# Patient Record
Sex: MALE | URBAN - METROPOLITAN AREA
[De-identification: names, ages, dates, MRNs, and addresses within clinical notes are randomized per-mention and may not be internally consistent; named-entity substitution may affect disease eponyms.]

---

## 2019-02-06 ENCOUNTER — INPATIENT (INPATIENT)
Facility: HOSPITAL | Age: 59
LOS: 9 days | Discharge: HOME | DRG: 233 | End: 2019-02-16
Attending: THORACIC SURGERY (CARDIOTHORACIC VASCULAR SURGERY) | Admitting: INTERNAL MEDICINE
Payer: COMMERCIAL

## 2019-02-06 VITALS
SYSTOLIC BLOOD PRESSURE: 139 MMHG | RESPIRATION RATE: 16 BRPM | WEIGHT: 210.1 LBS | HEART RATE: 101 BPM | OXYGEN SATURATION: 94 % | HEIGHT: 68 IN | DIASTOLIC BLOOD PRESSURE: 97 MMHG | TEMPERATURE: 99 F

## 2019-02-06 DIAGNOSIS — I21.3 ST ELEVATION (STEMI) MYOCARDIAL INFARCTION OF UNSPECIFIED SITE: ICD-10-CM

## 2019-02-06 DIAGNOSIS — F11.10 OPIOID ABUSE, UNCOMPLICATED: ICD-10-CM

## 2019-02-06 DIAGNOSIS — I25.10 ATHEROSCLEROTIC HEART DISEASE OF NATIVE CORONARY ARTERY WITHOUT ANGINA PECTORIS: ICD-10-CM

## 2019-02-06 DIAGNOSIS — Z96.641 PRESENCE OF RIGHT ARTIFICIAL HIP JOINT: Chronic | ICD-10-CM

## 2019-02-06 PROBLEM — Z00.00 ENCOUNTER FOR PREVENTIVE HEALTH EXAMINATION: Status: ACTIVE | Noted: 2019-02-06

## 2019-02-06 LAB
ALBUMIN SERPL ELPH-MCNC: 3.7 G/DL — SIGNIFICANT CHANGE UP (ref 3.3–5)
ALP SERPL-CCNC: 56 U/L — SIGNIFICANT CHANGE UP (ref 40–120)
ALT FLD-CCNC: 49 U/L — HIGH (ref 10–45)
AMPHET UR-MCNC: NEGATIVE — SIGNIFICANT CHANGE UP
ANION GAP SERPL CALC-SCNC: 14 MMOL/L — SIGNIFICANT CHANGE UP (ref 5–17)
APAP SERPL-MCNC: <15 UG/ML — SIGNIFICANT CHANGE UP (ref 10–30)
APTT BLD: 30.4 SEC — SIGNIFICANT CHANGE UP (ref 27.5–36.3)
APTT BLD: 77 SEC — HIGH (ref 27.5–36.3)
AST SERPL-CCNC: 220 U/L — HIGH (ref 10–40)
BARBITURATES UR SCN-MCNC: NEGATIVE — SIGNIFICANT CHANGE UP
BASOPHILS # BLD AUTO: 0 K/UL — SIGNIFICANT CHANGE UP (ref 0–0.2)
BASOPHILS NFR BLD AUTO: 0 % — SIGNIFICANT CHANGE UP (ref 0–2)
BENZODIAZ UR-MCNC: NEGATIVE — SIGNIFICANT CHANGE UP
BILIRUB SERPL-MCNC: 0.2 MG/DL — SIGNIFICANT CHANGE UP (ref 0.2–1.2)
BLD GP AB SCN SERPL QL: NEGATIVE — SIGNIFICANT CHANGE UP
BUN SERPL-MCNC: 34 MG/DL — HIGH (ref 7–23)
CALCIUM SERPL-MCNC: 8.6 MG/DL — SIGNIFICANT CHANGE UP (ref 8.4–10.5)
CHLORIDE SERPL-SCNC: 105 MMOL/L — SIGNIFICANT CHANGE UP (ref 96–108)
CK MB BLD-MCNC: 9.9 % — HIGH (ref 0–3.5)
CK MB CFR SERPL CALC: 313.2 NG/ML — HIGH (ref 0–6.7)
CK SERPL-CCNC: 3151 U/L — HIGH (ref 30–200)
CO2 SERPL-SCNC: 19 MMOL/L — LOW (ref 22–31)
COCAINE METAB.OTHER UR-MCNC: NEGATIVE — SIGNIFICANT CHANGE UP
CREAT SERPL-MCNC: 1.83 MG/DL — HIGH (ref 0.5–1.3)
EOSINOPHIL # BLD AUTO: 0 K/UL — SIGNIFICANT CHANGE UP (ref 0–0.5)
EOSINOPHIL NFR BLD AUTO: 0.2 % — SIGNIFICANT CHANGE UP (ref 0–6)
ETHANOL SERPL-MCNC: SIGNIFICANT CHANGE UP MG/DL (ref 0–10)
GLUCOSE SERPL-MCNC: 99 MG/DL — SIGNIFICANT CHANGE UP (ref 70–99)
HCT VFR BLD CALC: 42 % — SIGNIFICANT CHANGE UP (ref 39–50)
HCT VFR BLD CALC: 45.9 % — SIGNIFICANT CHANGE UP (ref 39–50)
HGB BLD-MCNC: 14.5 G/DL — SIGNIFICANT CHANGE UP (ref 13–17)
HGB BLD-MCNC: 15.3 G/DL — SIGNIFICANT CHANGE UP (ref 13–17)
INR BLD: 1.08 RATIO — SIGNIFICANT CHANGE UP (ref 0.88–1.16)
LYMPHOCYTES # BLD AUTO: 0.7 K/UL — LOW (ref 1–3.3)
LYMPHOCYTES # BLD AUTO: 8.2 % — LOW (ref 13–44)
MCHC RBC-ENTMCNC: 30.3 PG — SIGNIFICANT CHANGE UP (ref 27–34)
MCHC RBC-ENTMCNC: 30.9 PG — SIGNIFICANT CHANGE UP (ref 27–34)
MCHC RBC-ENTMCNC: 33.3 GM/DL — SIGNIFICANT CHANGE UP (ref 32–36)
MCHC RBC-ENTMCNC: 34.5 GM/DL — SIGNIFICANT CHANGE UP (ref 32–36)
MCV RBC AUTO: 89.6 FL — SIGNIFICANT CHANGE UP (ref 80–100)
MCV RBC AUTO: 91 FL — SIGNIFICANT CHANGE UP (ref 80–100)
METHADONE UR-MCNC: NEGATIVE — SIGNIFICANT CHANGE UP
MONOCYTES # BLD AUTO: 0.6 K/UL — SIGNIFICANT CHANGE UP (ref 0–0.9)
MONOCYTES NFR BLD AUTO: 7.4 % — SIGNIFICANT CHANGE UP (ref 2–14)
NEUTROPHILS # BLD AUTO: 7.3 K/UL — SIGNIFICANT CHANGE UP (ref 1.8–7.4)
NEUTROPHILS NFR BLD AUTO: 84.2 % — HIGH (ref 43–77)
NT-PROBNP SERPL-SCNC: 4682 PG/ML — HIGH (ref 0–300)
OPIATES UR-MCNC: POSITIVE
OXYCODONE UR-MCNC: NEGATIVE — SIGNIFICANT CHANGE UP
PA ADP PRP-ACNC: 5 PRU — LOW (ref 194–417)
PCP SPEC-MCNC: SIGNIFICANT CHANGE UP
PCP UR-MCNC: NEGATIVE — SIGNIFICANT CHANGE UP
PLATELET # BLD AUTO: 282 K/UL — SIGNIFICANT CHANGE UP (ref 150–400)
PLATELET # BLD AUTO: 326 K/UL — SIGNIFICANT CHANGE UP (ref 150–400)
POTASSIUM SERPL-MCNC: 5.2 MMOL/L — SIGNIFICANT CHANGE UP (ref 3.5–5.3)
POTASSIUM SERPL-SCNC: 5.2 MMOL/L — SIGNIFICANT CHANGE UP (ref 3.5–5.3)
PROT SERPL-MCNC: 6.9 G/DL — SIGNIFICANT CHANGE UP (ref 6–8.3)
PROTHROM AB SERPL-ACNC: 12.5 SEC — SIGNIFICANT CHANGE UP (ref 10–12.9)
RBC # BLD: 4.69 M/UL — SIGNIFICANT CHANGE UP (ref 4.2–5.8)
RBC # BLD: 5.04 M/UL — SIGNIFICANT CHANGE UP (ref 4.2–5.8)
RBC # FLD: 12.8 % — SIGNIFICANT CHANGE UP (ref 10.3–14.5)
RBC # FLD: 13.3 % — SIGNIFICANT CHANGE UP (ref 10.3–14.5)
RH IG SCN BLD-IMP: POSITIVE — SIGNIFICANT CHANGE UP
SALICYLATES SERPL-MCNC: <2 MG/DL — LOW (ref 15–30)
SODIUM SERPL-SCNC: 138 MMOL/L — SIGNIFICANT CHANGE UP (ref 135–145)
THC UR QL: NEGATIVE — SIGNIFICANT CHANGE UP
TROPONIN T, HIGH SENSITIVITY RESULT: 1651 NG/L — HIGH (ref 0–51)
WBC # BLD: 8.7 K/UL — SIGNIFICANT CHANGE UP (ref 3.8–10.5)
WBC # BLD: 9.3 K/UL — SIGNIFICANT CHANGE UP (ref 3.8–10.5)
WBC # FLD AUTO: 8.7 K/UL — SIGNIFICANT CHANGE UP (ref 3.8–10.5)
WBC # FLD AUTO: 9.3 K/UL — SIGNIFICANT CHANGE UP (ref 3.8–10.5)

## 2019-02-06 PROCEDURE — 93306 TTE W/DOPPLER COMPLETE: CPT | Mod: 26

## 2019-02-06 PROCEDURE — 99255 IP/OBS CONSLTJ NEW/EST HI 80: CPT

## 2019-02-06 PROCEDURE — 93458 L HRT ARTERY/VENTRICLE ANGIO: CPT | Mod: 26,GC

## 2019-02-06 PROCEDURE — 99254 IP/OBS CNSLTJ NEW/EST MOD 60: CPT

## 2019-02-06 PROCEDURE — 99285 EMERGENCY DEPT VISIT HI MDM: CPT | Mod: 25

## 2019-02-06 PROCEDURE — 93010 ELECTROCARDIOGRAM REPORT: CPT

## 2019-02-06 PROCEDURE — 99152 MOD SED SAME PHYS/QHP 5/>YRS: CPT | Mod: GC

## 2019-02-06 RX ORDER — HEPARIN SODIUM 5000 [USP'U]/ML
1300 INJECTION INTRAVENOUS; SUBCUTANEOUS
Qty: 25000 | Refills: 0 | Status: DISCONTINUED | OUTPATIENT
Start: 2019-02-06 | End: 2019-02-06

## 2019-02-06 RX ORDER — HEPARIN SODIUM 5000 [USP'U]/ML
1300 INJECTION INTRAVENOUS; SUBCUTANEOUS
Qty: 25000 | Refills: 0 | Status: DISCONTINUED | OUTPATIENT
Start: 2019-02-06 | End: 2019-02-11

## 2019-02-06 RX ORDER — ATORVASTATIN CALCIUM 80 MG/1
40 TABLET, FILM COATED ORAL AT BEDTIME
Qty: 0 | Refills: 0 | Status: DISCONTINUED | OUTPATIENT
Start: 2019-02-06 | End: 2019-02-07

## 2019-02-06 RX ORDER — HEPARIN SODIUM 5000 [USP'U]/ML
4000 INJECTION INTRAVENOUS; SUBCUTANEOUS EVERY 6 HOURS
Qty: 0 | Refills: 0 | Status: DISCONTINUED | OUTPATIENT
Start: 2019-02-06 | End: 2019-02-11

## 2019-02-06 RX ORDER — SODIUM CHLORIDE 9 MG/ML
1000 INJECTION INTRAMUSCULAR; INTRAVENOUS; SUBCUTANEOUS
Qty: 0 | Refills: 0 | Status: DISCONTINUED | OUTPATIENT
Start: 2019-02-06 | End: 2019-02-11

## 2019-02-06 RX ORDER — HEPARIN SODIUM 5000 [USP'U]/ML
4000 INJECTION INTRAVENOUS; SUBCUTANEOUS EVERY 6 HOURS
Qty: 0 | Refills: 0 | Status: DISCONTINUED | OUTPATIENT
Start: 2019-02-06 | End: 2019-02-06

## 2019-02-06 RX ORDER — ASPIRIN/CALCIUM CARB/MAGNESIUM 324 MG
1 TABLET ORAL
Qty: 0 | Refills: 0 | COMMUNITY

## 2019-02-06 RX ORDER — METOPROLOL TARTRATE 50 MG
25 TABLET ORAL
Qty: 0 | Refills: 0 | Status: DISCONTINUED | OUTPATIENT
Start: 2019-02-06 | End: 2019-02-11

## 2019-02-06 RX ORDER — HEPARIN SODIUM 5000 [USP'U]/ML
INJECTION INTRAVENOUS; SUBCUTANEOUS
Qty: 25000 | Refills: 0 | Status: DISCONTINUED | OUTPATIENT
Start: 2019-02-06 | End: 2019-02-06

## 2019-02-06 RX ORDER — ASPIRIN/CALCIUM CARB/MAGNESIUM 324 MG
81 TABLET ORAL DAILY
Qty: 0 | Refills: 0 | Status: DISCONTINUED | OUTPATIENT
Start: 2019-02-06 | End: 2019-02-11

## 2019-02-06 RX ADMIN — Medication 50 MILLIGRAM(S): at 17:58

## 2019-02-06 RX ADMIN — Medication 0.5 MILLIGRAM(S): at 13:29

## 2019-02-06 RX ADMIN — ATORVASTATIN CALCIUM 40 MILLIGRAM(S): 80 TABLET, FILM COATED ORAL at 22:15

## 2019-02-06 RX ADMIN — HEPARIN SODIUM 1300 UNIT(S)/HR: 5000 INJECTION INTRAVENOUS; SUBCUTANEOUS at 11:06

## 2019-02-06 RX ADMIN — Medication 25 MILLIGRAM(S): at 17:58

## 2019-02-06 RX ADMIN — Medication 30 MILLILITER(S): at 08:59

## 2019-02-06 RX ADMIN — HEPARIN SODIUM 1600 UNIT(S)/HR: 5000 INJECTION INTRAVENOUS; SUBCUTANEOUS at 21:16

## 2019-02-06 RX ADMIN — HEPARIN SODIUM 1300 UNIT(S)/HR: 5000 INJECTION INTRAVENOUS; SUBCUTANEOUS at 22:10

## 2019-02-06 NOTE — ED PROVIDER NOTE - OBJECTIVE STATEMENT
59 yo M h/o opiod abuse and HTN transfer from [facility] for opioid OD and CP x 2 wks w/ concerning EKG findings. Patient reports 2 wks of CP, worse over 2 days, located center of his chest. When he pushes down on his chest the pain always goes away. Denies SOB, radiation, nausea or vomiting at any time during the course of 2 wks. At [facility], he was w/ pinpoint pupils, hypotensive, and resp depression requiring naloxone x 1 which woke him up. No rebound. Found to have an elevated troponin and concerning EKG w/ SHANIA in inferior leads. Per reports, this EKG was similar to a prior one at [facility]. He received  mg, Plavix 600 mg, Berlinta 180 mg and Heparin drip (all ~5-6am). CT head was negative. Patient w/o CP at present, but mild right sided jaw discomfort. Denies SOB, pleuritic pain, fevers, chills, nausea, or vomiting.

## 2019-02-06 NOTE — ED ADULT NURSE NOTE - OBJECTIVE STATEMENT
58YOM PMH HTN, hyperlipidemia presnents to [facility] Per EMS, pt was found unconcious [facility], pinpoint pupils and narcan was given. Pt woke up after narcan and c/o CP. Pt EKG abnormal and transferred to [facility]. 58YOM PMH HTN, hyperlipidemia presents to ED transfer from [facility] c/o CP, diagnosis NSTEMI. Per EMS, pt was found unconscious and brought to [facility], pinpoint pupils and narcan was given.  Pt woke up after narcan and c/o CP. Pt EKG abnormal and transferred to [facility]. Pt denies CP at this time. EKG done, pt on cardiac monitor. Will continue to monitor. Pt arrived to ED with heparin running at 14.3ml/hr, confirmed by paperwork from .  No signs of bleeding noted. Dr. Riley at bedside, aware. Repeat ppt drawn, sent to lab. Pt A&Ox3, denies pain. Denies SOB, HA, dizziness, fever, chills, N/V/D. back pain, burning upon urination. Will continue to monitor. 58YOM PMH HTN, hyperlipidemia presents to ED transfer from [facility] c/o CP, diagnosis NSTEMI. Per EMS, pt was found unconscious and brought to [facility], pinpoint pupils and narcan was given.  Pt woke up after narcan and c/o CP. Pt EKG abnormal and transferred to [facility]. Pt denies CP at this time. EKG done, pt on cardiac monitor. Will continue to monitor. Pt arrived to ED with heparin running at 14.3ml/hr, confirmed by paperwork from .  No signs of bleeding noted. Dr. Riley at bedside, aware. Repeat ppt drawn, sent to lab. Pt A&Ox3, denies pain. Denies SOB, HA, dizziness, fever, chills, N/V/D. back pain, burning upon urination. Will continue to monitor.    09:40, results of INR 77, per [facility] policy, pt suppose to be lowered 1ml/hr. Pt started on 13ml/hr per MD Riley. VS stable, no signs of bleeding.

## 2019-02-06 NOTE — CONSULT NOTE ADULT - ASSESSMENT
58M with PMH of HTN, HLD, INOCENCIA (Cr 1.8, GFR 40), current smoker and heroin abuse who was found down at his workplace yesterday after overdosing on heroin at work. He received Narcan and was brought to [facility]. At OSH, EKG concerning for IWSTEMI. Troponin T 0.354. CT head was negative and he was given ASA, Brilinta 180 x1, Plavix 600 mg x1 (unclear why both were given) and started on a heparin gtt. He was transferred to [facility] ED for cath evaluation.   He reports having intermittent chest pain or shortness of breath 1-2 weeks PTA. He currently does have R jaw tingling. His exertional capacity is limited by foot drop for which he wears a brace. He had a stress test about 5 years that he thinks was normal. He smokes 1 pack per day. Occasionally snorts heroin, denies other drug use. His wife does not know about his drug use. Started using heroin last year- twice a week, minimal amount as per patient (" 2 bags") 58M with PMH of HTN, HLD, INOCENCIA (Cr 1.8, GFR 40), current smoker and heroin abuse who was found down at his workplace yesterday after overdosing on heroin at work. He received Narcan and was brought to [facility]. At OSH, EKG concerning for IWSTEMI. Troponin T 0.354. CT head was negative and he was given ASA, Brilinta 180 x1, Plavix 600 mg x1 (unclear why both were given) and started on a heparin gtt. He was transferred to [facility]  for LHC which showed triple vessel CAD and CT Surgery consulted for CABG evaluation. He reports having intermittent chest pain or shortness of breath 1-2 weeks PTA.

## 2019-02-06 NOTE — ED PROVIDER NOTE - CARE PLAN
Principal Discharge DX:	ST elevation myocardial infarction (STEMI) involving other coronary artery of inferior wall  Secondary Diagnosis:	Opioid abuse

## 2019-02-06 NOTE — ED PROVIDER NOTE - ATTENDING CONTRIBUTION TO CARE
Dr. Riely (Attending Physician)  I performed a history and physical exam of the patient and discussed their management with the resident. I reviewed the resident's note and agree with the documented findings and plan of care. My medical decision making and observations are found above.

## 2019-02-06 NOTE — H&P CARDIOLOGY - HISTORY OF PRESENT ILLNESS
58M with PMH of HTN, HLD and heroin abuse who was found down at his workplace yesterday after overdosing on heroin at work. He received Narcan and was brought to [facility]. At OSH, EKG concerning for IWSTEMI. Troponin T 0.354. CT head was negative and he was given ASA, Brilinta 180 x1, Plavix 600 mg x1 (unclear why both were given) and started on a heparin gtt. He was transferred to [facility] ED for cath evaluation.     He reports having intermittent chest pain or shortness of breath 1-2 weeks PTA. He currently does have R jaw tingling. His exertional capacity is limited by foot drop for which he wears a brace. He had a stress test about 5 years that he thinks was normal. He smokes 1 pack per day. Occasionally snorts heroin, denies other drug use. His wife does not know about his drug use. Started using heroin last year 58M with PMH of HTN, HLD, INOCENCIA (Cr 1.8, GFR 40), current smoker and heroin abuse who was found down at his workplace yesterday after overdosing on heroin at work. He received Narcan and was brought to [facility]. At OSH, EKG concerning for IWSTEMI. Troponin T 0.354. CT head was negative and he was given ASA, Brilinta 180 x1, Plavix 600 mg x1 (unclear why both were given) and started on a heparin gtt. He was transferred to [facility] ED for cath evaluation.     He reports having intermittent chest pain or shortness of breath 1-2 weeks PTA. He currently does have R jaw tingling. His exertional capacity is limited by foot drop for which he wears a brace. He had a stress test about 5 years that he thinks was normal. He smokes 1 pack per day. Occasionally snorts heroin, denies other drug use. His wife does not know about his drug use. Started using heroin last year- twice a week, minimal amount as per patient (" 2 bags")

## 2019-02-06 NOTE — CONSULT NOTE ADULT - SUBJECTIVE AND OBJECTIVE BOX
History of Present Illness:  HPI:  58M with PMH of HTN, HLD, INOCENCIA (Cr 1.8, GFR 40), current smoker and heroin abuse who was found down at his workplace yesterday after overdosing on heroin at work. He received Narcan and was brought to [facility]. At OSH, EKG concerning for IWSTEMI. Troponin T 0.354. CT head was negative and he was given ASA, Brilinta 180 x1, Plavix 600 mg x1 (unclear why both were given) and started on a heparin gtt. He was transferred to [facility] ED for cath evaluation.     He reports having intermittent chest pain or shortness of breath 1-2 weeks PTA. He currently does have R jaw tingling. His exertional capacity is limited by foot drop for which he wears a brace. He had a stress test about 5 years that he thinks was normal. He smokes 1 pack per day. Occasionally snorts heroin, denies other drug use. His wife does not know about his drug use. Started using heroin last year- twice a week, minimal amount as per patient (" 2 bags") (2019 11:54)       Past Medical History  HLD (hyperlipidemia)  Hypertension  Opioid abuse      Past Surgical History  History of total right hip replacement      MEDICATIONS  (STANDING):  aspirin enteric coated 81 milliGRAM(s) Oral daily  heparin  Infusion. 1300 Unit(s)/Hr (13 mL/Hr) IV Continuous <Continuous>  LORazepam   Injectable 0.5 milliGRAM(s) IntraMuscular once  pregabalin 50 milliGRAM(s) Oral two times a day  sodium chloride 0.9%. 1000 milliLiter(s) (250 mL/Hr) IV Continuous <Continuous>    MEDICATIONS  (PRN):  heparin  Injectable 4000 Unit(s) IV Push every 6 hours PRN For aPTT less than 40      Vital Signs Last 24 Hrs  T(C): 36.6 (19 @ 11:54), Max: 37.3 (19 @ 07:49)  T(F): 97.9 (19 @ 11:54), Max: 99.2 (19 @ 07:49)  HR: 101 (19 @ 11:54) (101 - 108)  BP: 170/10 (19 @ 11:54) (134/83 - 170/10)  RR: 18 (19 @ 11:07) (16 - 18)  SpO2: 94% (19 @ 11:54) (94% - 99%)           Daily Height in cm: 177.8 (2019 11:54)    Daily Weight in k.3 (2019 11:54)  Admit Wt: Drug Dosing Weight  Height (cm): 177.8 (2019 11:54)  Weight (kg): 95.3 (2019 11:54)  BMI (kg/m2): 30.1 (2019 11:54)  BSA (m2): 2.13 (2019 11:54)    Allergies: No Known Allergies      SOCIAL HISTORY:  Smoker: [ ] Yes  [X] No                 Pt denies  ETOH use: [ ] Yes  [X] No             Pt denies  Ilicit Drug use:  [ ] Yes  [X] No     Pt denies    FAMILY HISTORY:  Family history of early CAD (Grandparent)      Review of Systems  GENERAL:  no weakness, fatigue, fevers or chills  NEURO: no dizziness, numbness, tingling or weakness  SKIN: no itching, burning, rashes, or lesions   HEENT: no visual changes;  no headache, no vertigo, no recent colds  RESPIRATORY: no shortness of breath, no cough, sputum, wheezing  CARDIOVASCULAR:  no chest pain,  or palpitations  GI: no abd pain. no N/V/D.  PERIPHERAL VASCULAR: no swelling, no tenderness, no erythema    PHYSICAL EXAM  General: Well nourished, well developed, NAD.                                              Neuro: Normal exam oriented to person/place & time with no focal motor or sensory  deficits.                    Eyes: Normal exam of conjunctiva & lids, pupils equally reactive.   ENT: Normal exam of nasal/oral mucosa with absence of cyanosis.   Neck: Normal exam of jugular veins, trachea & thyroid.   Chest: Normal lung exam with good air movement absence of wheezes, rales, or rhonchi.                                                                         CV:  Auscultation: normal S1S2, RRR   Carotids: No Bruits[X]  Abdominal Aorta: normal [X] nonpalpable[X]                                                                         GI: Normal exam of abdomen with no noted masses or tenderness. +BSx4Q                                                                                            Extremities: Normal no evidence of cyanosis or deformity, Edema: none  Lower Extremity Pulses: Right[+2DP] Left[+2DP] Varicosities[none]  SKIN : Normal exam to inspection & palpation.                                                           LABS:                        15.3   8.7   )-----------( 326      ( 2019 08:48 )             45.9     138  |  105  |  34<H>  ----------------------------<  99  5.2   |  19<L>  |  1.83<H>    AST  220<H>  /  ALT  49<H>  /  AlkPhos  56  02-06    PT/INR - ( 2019 08:48 )   PT: 12.5 sec;   INR: 1.08 ratio   PTT:77.0 sec History of Present Illness:  58M with PMH of HTN, HLD, INOCENCIA (Cr 1.8, GFR 40), current smoker and heroin abuse who was found down at his workplace yesterday after overdosing on heroin at work. He received Narcan and was brought to [facility]. At OSH, EKG concerning for IWSTEMI. Troponin T 0.354. CT head was negative and he was given ASA, Brilinta 180 x1, Plavix 600 mg x1 (unclear why both were given) and started on a heparin gtt. He was transferred to [facility] ED for cath evaluation.   He reports having intermittent chest pain or shortness of breath 1-2 weeks PTA. He currently does have R jaw tingling. His exertional capacity is limited by foot drop for which he wears a brace. He had a stress test about 5 years that he thinks was normal. He smokes 1 pack per day. Occasionally snorts heroin, denies other drug use. His wife does not know about his drug use. Started using heroin last year- twice a week, minimal amount as per patient (" 2 bags") (06 Feb 2019 11:54)       Past Medical History  HLD (hyperlipidemia)  Hypertension  Opioid abuse      Past Surgical History  History of total right hip replacement      MEDICATIONS  (STANDING):  aspirin enteric coated 81 milliGRAM(s) Oral daily  heparin  Infusion. 1300 Unit(s)/Hr (13 mL/Hr) IV Continuous <Continuous>  LORazepam   Injectable 0.5 milliGRAM(s) IntraMuscular once  pregabalin 50 milliGRAM(s) Oral two times a day  sodium chloride 0.9%. 1000 milliLiter(s) (250 mL/Hr) IV Continuous <Continuous>    MEDICATIONS  (PRN):  heparin  Injectable 4000 Unit(s) IV Push every 6 hours PRN For aPTT less than 40      Vital Signs Last 24 Hrs  T(C): 36.6 (02-06-19 @ 11:54), Max: 37.3 (02-06-19 @ 07:49)  T(F): 97.9 (02-06-19 @ 11:54), Max: 99.2 (02-06-19 @ 07:49)  HR: 101 (02-06-19 @ 11:54) (101 - 108)  BP: 170/10 (02-06-19 @ 11:54) (134/83 - 170/10)  RR: 18 (02-06-19 @ 11:07) (16 - 18)  SpO2: 94% (02-06-19 @ 11:54) (94% - 99%)             Height (cm): 177.8 Weight (kg): 95.3   BMI (kg/m2): 30.1    (06 Feb 2019 11:54)      Allergies: No Known Allergies      SOCIAL HISTORY:  , lives with spouse, 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 no N/V/D.  PERIPHERAL VASCULAR: no swelling, no tenderness, no erythema    PHYSICAL EXAM  General: Well nourished, well developed, NAD.                                              Neuro: Normal exam oriented to person/place & time with no focal motor or sensory  deficits.                    Eyes: Normal exam of conjunctiva & lids, pupils equally reactive.   ENT: Normal exam of nasal/oral mucosa with absence of cyanosis.   Neck: Normal exam of jugular veins, trachea & thyroid.   Chest: Normal lung exam with good air movement absence of wheezes, rales, or rhonchi.                                                                         CV:  Auscultation: normal S1S2, RRR   Carotids: No Bruits[X]  Abdominal Aorta: normal [X] nonpalpable[X]                                                                         GI: Normal exam of abdomen with no noted masses or tenderness. +BSx4Q                                                                                            Extremities: Normal no evidence of cyanosis or deformity, Edema: none  Lower Extremity Pulses: Right[+2DP] Left[+2DP] Varicosities[none]  SKIN : Normal exam to inspection & palpation.                                                           LABS:                        15.3   8.7   )-----------( 326      ( 06 Feb 2019 08:48 )             45.9     138  |  105  |  34<H>  ----------------------------<  99  5.2   |  19<L>  |  1.83<H>    AST  220<H>  /  ALT  49<H>  /  AlkPhos  56  02-06    PT/INR - ( 06 Feb 2019 08:48 )   PT: 12.5 sec;   INR: 1.08 ratio   PTT:77.0 sec History of Present Illness:  58M with PMH of HTN, HLD, INOCENCIA (Cr 1.8, GFR 40), current smoker and heroin abuse who was found down at his workplace yesterday after overdosing on heroin at work. He received Narcan and was brought to [facility]. At OSH, EKG concerning for IWSTEMI. Troponin T 0.354. CT head was negative and he was given ASA, Brilinta 180 x1, Plavix 600 mg x1 (unclear why both were given) and started on a heparin gtt. He was transferred to [facility] ED for cath evaluation.   He reports having intermittent chest pain or shortness of breath 1-2 weeks PTA. He currently does have R jaw tingling. His exertional capacity is limited by foot drop for which he wears a brace. He had a stress test about 5 years that he thinks was normal. He smokes 1 pack per day. Occasionally snorts heroin, denies other drug use. His wife does not know about his drug use. Started using heroin last year- twice a week, minimal amount as per patient (" 2 bags") (06 Feb 2019 11:54)       Past Medical History  HLD (hyperlipidemia)  Hypertension  Opioid abuse      Past Surgical History  History of total right hip replacement      MEDICATIONS  (STANDING):  aspirin enteric coated 81 milliGRAM(s) Oral daily  heparin  Infusion. 1300 Unit(s)/Hr (13 mL/Hr) IV Continuous <Continuous>  LORazepam   Injectable 0.5 milliGRAM(s) IntraMuscular once  pregabalin 50 milliGRAM(s) Oral two times a day  sodium chloride 0.9%. 1000 milliLiter(s) (250 mL/Hr) IV Continuous <Continuous>    MEDICATIONS  (PRN):  heparin  Injectable 4000 Unit(s) IV Push every 6 hours PRN For aPTT less than 40      Vital Signs Last 24 Hrs  T(C): 36.6 (02-06-19 @ 11:54), Max: 37.3 (02-06-19 @ 07:49)  T(F): 97.9 (02-06-19 @ 11:54), Max: 99.2 (02-06-19 @ 07:49)  HR: 101 (02-06-19 @ 11:54) (101 - 108)  BP: 170/10 (02-06-19 @ 11:54) (134/83 - 170/10)  RR: 18 (02-06-19 @ 11:07) (16 - 18)  SpO2: 94% (02-06-19 @ 11:54) (94% - 99%)             Height (cm): 177.8 Weight (kg): 95.3   BMI (kg/m2): 30.1    (06 Feb 2019 11:54)      Allergies: No Known Allergies      SOCIAL HISTORY:  , lives with spouse, 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  Smoker: [X] Yes  [ ] No               Current Smoker 0.5-1 PPD x 43 years  ETOH use: [X] Yes  [ ] No            Social ETOH  Ilicit Drug use:  [X] Yes  [ ] No     Heroin (1-2 bags) daily     FAMILY HISTORY:  Family history of early CAD (Grandparent)      Review of Systems  GENERAL:  no weakness, fatigue, fevers or chills  NEURO: no dizziness, numbness, tingling or weakness  SKIN: no itching, burning, rashes, or lesions   HEENT: no visual changes;  no headache, no vertigo, no recent colds  RESPIRATORY: no shortness of breath, no cough, sputum, wheezing  CARDIOVASCULAR:  no chest pain,  or palpitations  GI: no abd pain. no N/V/D.  PERIPHERAL VASCULAR: no swelling, no tenderness, no erythema    PHYSICAL EXAM  General: Well nourished, well developed, NAD.                                              Neuro: Normal exam oriented to person/place & time with no focal motor or sensory  deficits.                    Eyes: Normal exam of conjunctiva & lids, pupils equally reactive.   ENT: Normal exam of nasal/oral mucosa with absence of cyanosis.   Neck: Normal exam of jugular veins, trachea & thyroid.   Chest: Normal lung exam with good air movement absence of wheezes, rales, or rhonchi.                                                                         CV:  Auscultation: normal S1S2, RRR   Carotids: No Bruits[X]  Abdominal Aorta: normal [X] nonpalpable[X]                                                                         GI: Normal exam of abdomen with no noted masses or tenderness. +BSx4Q                                                                                            Extremities: Normal no evidence of cyanosis or deformity, Edema: none  Lower Extremity Pulses: Right[+2DP] Left[+2DP] Varicosities[none]  SKIN : Normal exam to inspection & palpation.                                                           LABS:                        15.3   8.7   )-----------( 326      ( 06 Feb 2019 08:48 )             45.9     138  |  105  |  34<H>  ----------------------------<  99  5.2   |  19<L>  |  1.83<H>    AST  220<H>  /  ALT  49<H>  /  AlkPhos  56  02-06    PT/INR - ( 06 Feb 2019 08:48 )   PT: 12.5 sec;   INR: 1.08 ratio   PTT:77.0 sec

## 2019-02-06 NOTE — CONSULT NOTE ADULT - PROBLEM SELECTOR RECOMMENDATION 9
Continue pre-op cardiac surgery workup  Continue asa and start beta-blocker and statin  Check TTE/ PFTs /P2y12 - pt was plavix/brilinta loaded  Plan for CABG on Monday 2/11 with Dr. Christine

## 2019-02-06 NOTE — ED PROVIDER NOTE - MEDICAL DECISION MAKING DETAILS
Dr. Riley (Attending Physician)  Pt. tx from [facility] with SHANIA in ii, iii, avf after developing chest pain after narcan given for heroin overdose.  Tx to [facility] cath consult called on arrival.  two runs of NSVT while in ED.  Pt. initially refused cath now consenting.

## 2019-02-06 NOTE — CONSULT NOTE ADULT - SUBJECTIVE AND OBJECTIVE BOX
CHIEF COMPLAINT:    HISTORY OF PRESENT ILLNESS:      Allergies    No Known Allergies    Intolerances    	    MEDICATIONS:                  PAST MEDICAL & SURGICAL HISTORY:  Hypertension  Opioid abuse      FAMILY HISTORY:      SOCIAL HISTORY:    Non-smoker  Denies EtOH, illicit drugs    REVIEW OF SYSTEMS:  General: no fatigue/malaise, weight loss/gain.  Skin: no rashes.  Ophthalmologic: no blurred vision, no loss of vision. 	  ENT: no sore throat, rhinorrhea, sinus congestion.  Respiratory: no SOB, cough or wheeze.  Gastrointestinal:  no N/V/D, no melena/hematemesis/hematochezia.  Genitourinary: no dysuria/hesitancy or hematuria.  Musculoskeletal: no myalgias or arthralgias.  Neurological: no changes in vision or hearing, no lightheadedness/dizziness, no syncope/near syncope	  Psychiatric: no unusual stress/anxiety.   Hematology/Lymphatics: no unusual bleeding, bruising and no lymphadenopathy.  Endocrine: no unusual thirst.   All others negative except as stated above and in HPI.    PHYSICAL EXAM:  T(C): 37.3 (02-06-19 @ 07:49), Max: 37.3 (02-06-19 @ 07:49)  HR: 101 (02-06-19 @ 07:49) (101 - 101)  BP: 139/97 (02-06-19 @ 07:49) (139/97 - 139/97)  RR: 16 (02-06-19 @ 07:49) (16 - 16)  SpO2: 94% (02-06-19 @ 07:49) (94% - 94%)  Wt(kg): --  I&O's Summary      Appearance: no acute distress  HEENT:   Normal oral mucosa, PERRL, EOMI	  Lymphatic: No lymphadenopathy  Cardiovascular: RRR, Normal S1 S2, No JVD, No murmurs, No edema  Respiratory: Lungs clear to auscultation	  Psychiatry: A & O x 3, Mood & affect appropriate  Gastrointestinal:  Soft, Non-tender, + BS	  Skin: No rashes, No ecchymoses, No cyanosis	  Neurologic: Non-focal  Extremities: Normal range of motion, No clubbing, cyanosis or edema  Vascular: Peripheral pulses palpable 2+ bilaterally      LABS:	 	              proBNP:   Lipid Profile:   HgA1c:   TSH:       CARDIAC MARKERS:            TELEMETRY: 	    EKG:  	  RADIOLOGY:    PREVIOUS DIAGNOSTIC TESTING:    Echocardiogram    Catheterization    Stress Test  	  ASSESSMENT/PLAN:    #ACS  - Patient denies having any chest pain  - S/p ASA, Brilinta and heparin gtt at OSH  - EKG at OSH with SHANIA in inferior leads with Q waves, EKG here with no SHANIA, persistent Q waves. Appears to have completed the infarct  - Recommend left heart cath, but patient refused around 0800 this morning. Will address with him again this morning  - Check TTE  - Trend HS trop, CK, CKMB  - Check urine drug screen      DERRICK Thurman  Cardiology Fellow   [id number] CHIEF COMPLAINT: transfer from [place] after heroin overdose    HISTORY OF PRESENT ILLNESS:  58M with PMH of HTN, HLD and heroin use who was found down at his workplace yesterday after overdosing on heroin. He received Narcan and was brought to [facility]. At OSH, EKG concerning for IWSTEMI. Troponin T 0.354. CT head was negative and he was given ASA, Brilinta 180 x1, Plavix 600 mg x1 (unclear why both were given) and started on a heparin gtt. He was transferred to [facility] ED for cath evaluation.     Patient has no personal or family history of CAD. He denies having any chest pain or shortness of breath. He currently does have R jaw tingling. His exertional capacity is limited by foot drop for which he wears a brace. He had a stress test about 5 years that he thinks was normal. He smokes 1 pack per day. Occasionally snorts heroin, denies other drug use. His wife does not know about his drug use. He states that he does not have good insurance and is concerned about the hospital bills.     Allergies  No Known Allergies    MEDICATIONS:      PAST MEDICAL & SURGICAL HISTORY:  Hypertension  Opioid abuse    FAMILY HISTORY:  Father had valves replaced    SOCIAL HISTORY:    Smokes 1 ppd  Heroin as above      REVIEW OF SYSTEMS:  General: no fatigue/malaise, weight loss/gain.  Skin: no rashes.  Ophthalmologic: no blurred vision, no loss of vision. 	  ENT: no sore throat, rhinorrhea, sinus congestion.  Respiratory: no SOB, cough or wheeze.  Gastrointestinal:  no N/V/D, no melena/hematemesis/hematochezia.  Genitourinary: no dysuria/hesitancy or hematuria.  Musculoskeletal: no myalgias or arthralgias.  Neurological: no changes in vision or hearing, no lightheadedness/dizziness, no syncope/near syncope	  Psychiatric: no unusual stress/anxiety.   Hematology/Lymphatics: no unusual bleeding, bruising and no lymphadenopathy.  Endocrine: no unusual thirst.   All others negative except as stated above and in HPI.    PHYSICAL EXAM:  T(C): 37.3 (02-06-19 @ 07:49), Max: 37.3 (02-06-19 @ 07:49)  HR: 101 (02-06-19 @ 07:49) (101 - 101)  BP: 139/97 (02-06-19 @ 07:49) (139/97 - 139/97)  RR: 16 (02-06-19 @ 07:49) (16 - 16)  SpO2: 94% (02-06-19 @ 07:49) (94% - 94%)  Wt(kg): --  I&O's Summary      Appearance: no acute distress  HEENT:   pinpoint pupils	  Lymphatic: No lymphadenopathy  Cardiovascular: RRR, Normal S1 S2, No JVD, No murmurs, No edema  Respiratory: Lungs clear to auscultation	  Psychiatry: A & O x 3, Mood & affect appropriate  Gastrointestinal:  Soft, Non-tender, + BS	  Skin: No rashes, No ecchymoses, No cyanosis	  Neurologic: Non-focal  Extremities: Normal range of motion, No clubbing, cyanosis or edema  Vascular: Peripheral pulses palpable 2+ bilaterally      LABS:	 	              proBNP:   Lipid Profile:   HgA1c:   TSH:       CARDIAC MARKERS:  Trop T at OSH: 0.354          EKG: NSR Inferior Q waves	  RADIOLOGY:    PREVIOUS DIAGNOSTIC TESTING:    Echocardiogram    Catheterization    Stress Test  	  ASSESSMENT/PLAN:    #ACS  - Patient denies having any chest pain  - S/p ASA, Brilinta and heparin gtt at OSH  - EKG at OSH with SHANIA in inferior leads with Q waves, EKG here with no SHANIA, persistent Q waves. Appears to have completed the infarct  - Recommend left heart cath, but patient refused around 0800 this morning. Will address with him again this morning  - Check TTE  - Trend HS trop, CK, CKMB  - Check urine drug screen, lipid panel, Hgb A1c      DERRICK Thurman  Cardiology Fellow   [id number] CHIEF COMPLAINT: transfer from [place] after heroin overdose    HISTORY OF PRESENT ILLNESS:  58M with PMH of HTN, HLD and heroin use who was found down at his workplace yesterday after overdosing on heroin. He received Narcan and was brought to [facility]. At OSH, EKG concerning for IWSTEMI. Troponin T 0.354. CT head was negative and he was given ASA, Brilinta 180 x1, Plavix 600 mg x1 (unclear why both were given) and started on a heparin gtt. He was transferred to [facility] ED for cath evaluation.     Patient has no personal or family history of CAD. He denies having any chest pain or shortness of breath. He currently does have R jaw tingling. His exertional capacity is limited by foot drop for which he wears a brace. He had a stress test about 5 years that he thinks was normal. He smokes 1 pack per day. Occasionally snorts heroin, denies other drug use. His wife does not know about his drug use. He states that he does not have good insurance and is concerned about the hospital bills.     Allergies  No Known Allergies    MEDICATIONS:      PAST MEDICAL & SURGICAL HISTORY:  Hypertension  Opioid abuse    FAMILY HISTORY:  Father had valves replaced    SOCIAL HISTORY:    Smokes 1 ppd  Heroin as above      REVIEW OF SYSTEMS:  General: no fatigue/malaise, weight loss/gain.  Skin: no rashes.  Ophthalmologic: no blurred vision, no loss of vision. 	  ENT: no sore throat, rhinorrhea, sinus congestion.  Respiratory: no SOB, cough or wheeze.  Gastrointestinal:  no N/V/D, no melena/hematemesis/hematochezia.  Genitourinary: no dysuria/hesitancy or hematuria.  Musculoskeletal: no myalgias or arthralgias.  Neurological: no changes in vision or hearing, no lightheadedness/dizziness, no syncope/near syncope	  Psychiatric: no unusual stress/anxiety.   Hematology/Lymphatics: no unusual bleeding, bruising and no lymphadenopathy.  Endocrine: no unusual thirst.   All others negative except as stated above and in HPI.    PHYSICAL EXAM:  T(C): 37.3 (02-06-19 @ 07:49), Max: 37.3 (02-06-19 @ 07:49)  HR: 101 (02-06-19 @ 07:49) (101 - 101)  BP: 139/97 (02-06-19 @ 07:49) (139/97 - 139/97)  RR: 16 (02-06-19 @ 07:49) (16 - 16)  SpO2: 94% (02-06-19 @ 07:49) (94% - 94%)  Wt(kg): --  I&O's Summary      Appearance: no acute distress  HEENT:   pinpoint pupils	  Lymphatic: No lymphadenopathy  Cardiovascular: RRR, Normal S1 S2, No JVD, No murmurs, No edema  Respiratory: Lungs clear to auscultation	  Psychiatry: A & O x 3, Mood & affect appropriate  Gastrointestinal:  Soft, Non-tender, + BS	  Skin: No rashes, No ecchymoses, No cyanosis	  Neurologic: Non-focal  Extremities: Normal range of motion, No clubbing, cyanosis or edema  Vascular: Peripheral pulses palpable 2+ bilaterally      LABS:	 	              proBNP:   Lipid Profile:   HgA1c:   TSH:       CARDIAC MARKERS:  Trop T at OSH: 0.354          EKG: NSR Inferior Q waves	  RADIOLOGY:    PREVIOUS DIAGNOSTIC TESTING:    Echocardiogram    Catheterization    Stress Test  	  ASSESSMENT/PLAN:  58M with PMH of HTN, HLD, tobacco use and heroin use who was found down at his workplace yesterday after overdosing on heroin. He received Narcan and was brought to [facility]. At OSH, EKG concerning for IWSTEMI. Troponin T 0.354. CT head was negative and he was given ASA, Brilinta 180 x1, Plavix 600 mg x1 (unclear why both were given) and started on a heparin gtt. He was transferred to [facility] ED for cath evaluation. He is hemodynamically stable and CP free, but HS troponin 1651. Recommend [facility].     #ACS  - Patient denies having any chest pain  - S/p ASA, Brilinta, ?Plavix and heparin gtt at OSH  - EKG at OSH with SHANIA in inferior leads with Q waves, EKG here with no SHANIA, persistent Q waves. Appears to have completed the infarct  - HS trop 1600, tele with 2 episodes of NSVT  - Patient agreeable to cath, please admit under Dr. Chapa  - Check TTE  - Trend HS trop, CK, CKMB  - Check urine drug screen, lipid panel, Hgb A1c      DERRICK Thurman  Cardiology Fellow   [id number]

## 2019-02-07 LAB
ANION GAP SERPL CALC-SCNC: 16 MMOL/L — SIGNIFICANT CHANGE UP (ref 5–17)
APTT BLD: 45.6 SEC — HIGH (ref 27.5–36.3)
APTT BLD: 46 SEC — HIGH (ref 27.5–36.3)
APTT BLD: 51.2 SEC — HIGH (ref 27.5–36.3)
BUN SERPL-MCNC: 22 MG/DL — SIGNIFICANT CHANGE UP (ref 7–23)
CALCIUM SERPL-MCNC: 8.9 MG/DL — SIGNIFICANT CHANGE UP (ref 8.4–10.5)
CHLORIDE SERPL-SCNC: 104 MMOL/L — SIGNIFICANT CHANGE UP (ref 96–108)
CO2 SERPL-SCNC: 18 MMOL/L — LOW (ref 22–31)
CREAT SERPL-MCNC: 1.17 MG/DL — SIGNIFICANT CHANGE UP (ref 0.5–1.3)
GLUCOSE SERPL-MCNC: 105 MG/DL — HIGH (ref 70–99)
HBA1C BLD-MCNC: 5.8 % — HIGH (ref 4–5.6)
HCT VFR BLD CALC: 40.2 % — SIGNIFICANT CHANGE UP (ref 39–50)
HCT VFR BLD CALC: 40.8 % — SIGNIFICANT CHANGE UP (ref 39–50)
HGB BLD-MCNC: 14 G/DL — SIGNIFICANT CHANGE UP (ref 13–17)
HGB BLD-MCNC: 14 G/DL — SIGNIFICANT CHANGE UP (ref 13–17)
INR BLD: 1.28 RATIO — HIGH (ref 0.88–1.16)
MCHC RBC-ENTMCNC: 30.7 PG — SIGNIFICANT CHANGE UP (ref 27–34)
MCHC RBC-ENTMCNC: 31.3 PG — SIGNIFICANT CHANGE UP (ref 27–34)
MCHC RBC-ENTMCNC: 34.4 GM/DL — SIGNIFICANT CHANGE UP (ref 32–36)
MCHC RBC-ENTMCNC: 34.9 GM/DL — SIGNIFICANT CHANGE UP (ref 32–36)
MCV RBC AUTO: 89.3 FL — SIGNIFICANT CHANGE UP (ref 80–100)
MCV RBC AUTO: 89.6 FL — SIGNIFICANT CHANGE UP (ref 80–100)
PA ADP PRP-ACNC: 51 PRU — LOW (ref 194–417)
PLATELET # BLD AUTO: 259 K/UL — SIGNIFICANT CHANGE UP (ref 150–400)
PLATELET # BLD AUTO: 265 K/UL — SIGNIFICANT CHANGE UP (ref 150–400)
POTASSIUM SERPL-MCNC: 4.1 MMOL/L — SIGNIFICANT CHANGE UP (ref 3.5–5.3)
POTASSIUM SERPL-SCNC: 4.1 MMOL/L — SIGNIFICANT CHANGE UP (ref 3.5–5.3)
PROTHROM AB SERPL-ACNC: 14.8 SEC — HIGH (ref 10–12.9)
RBC # BLD: 4.48 M/UL — SIGNIFICANT CHANGE UP (ref 4.2–5.8)
RBC # BLD: 4.56 M/UL — SIGNIFICANT CHANGE UP (ref 4.2–5.8)
RBC # FLD: 13.1 % — SIGNIFICANT CHANGE UP (ref 10.3–14.5)
RBC # FLD: 13.1 % — SIGNIFICANT CHANGE UP (ref 10.3–14.5)
SODIUM SERPL-SCNC: 138 MMOL/L — SIGNIFICANT CHANGE UP (ref 135–145)
T3 SERPL-MCNC: 60 NG/DL — LOW (ref 80–200)
T4 AB SER-ACNC: 6 UG/DL — SIGNIFICANT CHANGE UP (ref 4.6–12)
TSH SERPL-MCNC: 0.84 UIU/ML — SIGNIFICANT CHANGE UP (ref 0.27–4.2)
WBC # BLD: 8.5 K/UL — SIGNIFICANT CHANGE UP (ref 3.8–10.5)
WBC # BLD: 8.8 K/UL — SIGNIFICANT CHANGE UP (ref 3.8–10.5)
WBC # FLD AUTO: 8.5 K/UL — SIGNIFICANT CHANGE UP (ref 3.8–10.5)
WBC # FLD AUTO: 8.8 K/UL — SIGNIFICANT CHANGE UP (ref 3.8–10.5)

## 2019-02-07 PROCEDURE — 99231 SBSQ HOSP IP/OBS SF/LOW 25: CPT

## 2019-02-07 PROCEDURE — 71045 X-RAY EXAM CHEST 1 VIEW: CPT | Mod: 26

## 2019-02-07 PROCEDURE — 94010 BREATHING CAPACITY TEST: CPT | Mod: 26

## 2019-02-07 RX ORDER — ALPRAZOLAM 0.25 MG
0.25 TABLET ORAL EVERY 12 HOURS
Qty: 0 | Refills: 0 | Status: DISCONTINUED | OUTPATIENT
Start: 2019-02-07 | End: 2019-02-08

## 2019-02-07 RX ORDER — CALCIUM CARBONATE 500(1250)
1 TABLET ORAL EVERY 6 HOURS
Qty: 0 | Refills: 0 | Status: DISCONTINUED | OUTPATIENT
Start: 2019-02-07 | End: 2019-02-11

## 2019-02-07 RX ORDER — ALPRAZOLAM 0.25 MG
0.25 TABLET ORAL ONCE
Qty: 0 | Refills: 0 | Status: DISCONTINUED | OUTPATIENT
Start: 2019-02-07 | End: 2019-02-07

## 2019-02-07 RX ADMIN — HEPARIN SODIUM 1500 UNIT(S)/HR: 5000 INJECTION INTRAVENOUS; SUBCUTANEOUS at 05:03

## 2019-02-07 RX ADMIN — Medication 0.25 MILLIGRAM(S): at 19:45

## 2019-02-07 RX ADMIN — Medication 1 TABLET(S): at 19:42

## 2019-02-07 RX ADMIN — HEPARIN SODIUM 1700 UNIT(S)/HR: 5000 INJECTION INTRAVENOUS; SUBCUTANEOUS at 12:26

## 2019-02-07 RX ADMIN — Medication 50 MILLIGRAM(S): at 17:50

## 2019-02-07 RX ADMIN — Medication 25 MILLIGRAM(S): at 05:30

## 2019-02-07 RX ADMIN — Medication 25 MILLIGRAM(S): at 17:50

## 2019-02-07 RX ADMIN — Medication 0.25 MILLIGRAM(S): at 15:18

## 2019-02-07 RX ADMIN — HEPARIN SODIUM 1900 UNIT(S)/HR: 5000 INJECTION INTRAVENOUS; SUBCUTANEOUS at 19:24

## 2019-02-07 RX ADMIN — Medication 50 MILLIGRAM(S): at 05:30

## 2019-02-07 RX ADMIN — Medication 81 MILLIGRAM(S): at 11:06

## 2019-02-07 NOTE — PROGRESS NOTE ADULT - SUBJECTIVE AND OBJECTIVE BOX
VITAL SIGNS    Telemetry:  sr  90    Vital Signs Last 24 Hrs  T(C): 36.3 (19 @ 13:44), Max: 36.8 (19 @ 20:20)  T(F): 97.4 (19 @ 13:44), Max: 98.2 (19 @ 20:20)  HR: 94 (19 @ 13:44) (94 - 101)  BP: 136/92 (19 @ 13:44) (136/92 - 151/100)  RR: 18 (19 @ 13:44) (18 - 18)  SpO2: 95% (19 @ 13:44) (95% - 97%)            Daily Weight in k.6 (2019 08:30)        CAPILLARY BLOOD GLUCOSE                         PHYSICAL EXAM    Neurology: alert and oriented x 3, moves all extremities with no defecits  CV :  RRR  Lungs:   CTA B/L  Abdomen: soft, nontender, nondistended, positive bowel sounds  Extremities:     lt foot splint   no edema   no calf tenderness

## 2019-02-07 NOTE — PROGRESS NOTE ADULT - ASSESSMENT
58M with PMH of HTN, HLD, INOCENCIA (Cr 1.8, GFR 40), current smoker and heroin abuse who was found down at his workplace yesterday after overdosing on heroin at work. He received Narcan and was brought to [facility]. At OSH, EKG concerning for IWSTEMI. Troponin T 0.354. CT head was negative and he was given ASA, Brilinta 180 x1, Plavix 600 mg x1 (unclear why both were given) and started on a heparin gtt. He was transferred to [facility] ED for cath evaluation.     He reports having intermittent chest pain or shortness of breath 1-2 weeks PTA. He currently does have R jaw tingling. His exertional capacity is limited by foot drop for which he wears a brace. He had a stress test about 5 years that he thinks was normal. He smokes 1 pack per day. Occasionally snorts heroin, denies other drug use. His wife does not know about his drug use. Started using heroin last year- twice a week, minimal amount as per patient (" 2 bags")  2/7    VSS    some anxiety,    xanaxr  prn,  Hep Gtt,  LE splint for prior foot drop

## 2019-02-08 DIAGNOSIS — F43.23 ADJUSTMENT DISORDER WITH MIXED ANXIETY AND DEPRESSED MOOD: ICD-10-CM

## 2019-02-08 LAB
ANION GAP SERPL CALC-SCNC: 16 MMOL/L — SIGNIFICANT CHANGE UP (ref 5–17)
APTT BLD: 61.1 SEC — HIGH (ref 27.5–36.3)
APTT BLD: 63.8 SEC — HIGH (ref 27.5–36.3)
BUN SERPL-MCNC: 17 MG/DL — SIGNIFICANT CHANGE UP (ref 7–23)
CALCIUM SERPL-MCNC: 9.1 MG/DL — SIGNIFICANT CHANGE UP (ref 8.4–10.5)
CHLORIDE SERPL-SCNC: 104 MMOL/L — SIGNIFICANT CHANGE UP (ref 96–108)
CO2 SERPL-SCNC: 17 MMOL/L — LOW (ref 22–31)
CREAT SERPL-MCNC: 1.1 MG/DL — SIGNIFICANT CHANGE UP (ref 0.5–1.3)
GLUCOSE SERPL-MCNC: 112 MG/DL — HIGH (ref 70–99)
HCT VFR BLD CALC: 40.5 % — SIGNIFICANT CHANGE UP (ref 39–50)
HGB BLD-MCNC: 14.4 G/DL — SIGNIFICANT CHANGE UP (ref 13–17)
MCHC RBC-ENTMCNC: 31.3 PG — SIGNIFICANT CHANGE UP (ref 27–34)
MCHC RBC-ENTMCNC: 35.6 GM/DL — SIGNIFICANT CHANGE UP (ref 32–36)
MCV RBC AUTO: 87.9 FL — SIGNIFICANT CHANGE UP (ref 80–100)
PLATELET # BLD AUTO: 270 K/UL — SIGNIFICANT CHANGE UP (ref 150–400)
POTASSIUM SERPL-MCNC: 3.6 MMOL/L — SIGNIFICANT CHANGE UP (ref 3.5–5.3)
POTASSIUM SERPL-SCNC: 3.6 MMOL/L — SIGNIFICANT CHANGE UP (ref 3.5–5.3)
RBC # BLD: 4.61 M/UL — SIGNIFICANT CHANGE UP (ref 4.2–5.8)
RBC # FLD: 12.8 % — SIGNIFICANT CHANGE UP (ref 10.3–14.5)
SODIUM SERPL-SCNC: 137 MMOL/L — SIGNIFICANT CHANGE UP (ref 135–145)
WBC # BLD: 7.2 K/UL — SIGNIFICANT CHANGE UP (ref 3.8–10.5)
WBC # FLD AUTO: 7.2 K/UL — SIGNIFICANT CHANGE UP (ref 3.8–10.5)

## 2019-02-08 PROCEDURE — 99223 1ST HOSP IP/OBS HIGH 75: CPT

## 2019-02-08 PROCEDURE — 99231 SBSQ HOSP IP/OBS SF/LOW 25: CPT | Mod: 24

## 2019-02-08 PROCEDURE — 99232 SBSQ HOSP IP/OBS MODERATE 35: CPT

## 2019-02-08 RX ORDER — ALPRAZOLAM 0.25 MG
0.25 TABLET ORAL ONCE
Qty: 0 | Refills: 0 | Status: DISCONTINUED | OUTPATIENT
Start: 2019-02-08 | End: 2019-02-08

## 2019-02-08 RX ORDER — ALPRAZOLAM 0.25 MG
0.5 TABLET ORAL ONCE
Qty: 0 | Refills: 0 | Status: DISCONTINUED | OUTPATIENT
Start: 2019-02-08 | End: 2019-02-08

## 2019-02-08 RX ORDER — ALPRAZOLAM 0.25 MG
0.25 TABLET ORAL EVERY 8 HOURS
Qty: 0 | Refills: 0 | Status: DISCONTINUED | OUTPATIENT
Start: 2019-02-08 | End: 2019-02-09

## 2019-02-08 RX ADMIN — Medication 50 MILLIGRAM(S): at 05:59

## 2019-02-08 RX ADMIN — Medication 0.25 MILLIGRAM(S): at 08:36

## 2019-02-08 RX ADMIN — Medication 50 MILLIGRAM(S): at 17:18

## 2019-02-08 RX ADMIN — Medication 0.25 MILLIGRAM(S): at 01:02

## 2019-02-08 RX ADMIN — Medication 81 MILLIGRAM(S): at 11:06

## 2019-02-08 RX ADMIN — Medication 25 MILLIGRAM(S): at 05:59

## 2019-02-08 RX ADMIN — HEPARIN SODIUM 1900 UNIT(S)/HR: 5000 INJECTION INTRAVENOUS; SUBCUTANEOUS at 02:25

## 2019-02-08 RX ADMIN — HEPARIN SODIUM 1900 UNIT(S)/HR: 5000 INJECTION INTRAVENOUS; SUBCUTANEOUS at 10:18

## 2019-02-08 RX ADMIN — Medication 0.5 MILLIGRAM(S): at 23:22

## 2019-02-08 RX ADMIN — Medication 1 TABLET(S): at 20:04

## 2019-02-08 RX ADMIN — Medication 0.25 MILLIGRAM(S): at 16:40

## 2019-02-08 RX ADMIN — Medication 25 MILLIGRAM(S): at 17:18

## 2019-02-08 RX ADMIN — Medication 0.25 MILLIGRAM(S): at 20:41

## 2019-02-08 NOTE — BEHAVIORAL HEALTH ASSESSMENT NOTE - NSBHSUICPROTECTFACT_PSY_A_CORE
Positive therapeutic relationships/Identifies reasons for living/Engaged in work or school/Responsibility to family and others/Supportive social network or family

## 2019-02-08 NOTE — BEHAVIORAL HEALTH ASSESSMENT NOTE - CASE SUMMARY
58M , with 1 adult son (1 dtr passed in 2011), employed as a  for the [employer], domiciled with wife, with PPHx of depressive disorder, remote h/o taking Zoloft, Lexapro, and Wellbutrin in the past for depression which was prescribed by his PCP, no h/o SA/SIB, no inpatient hospitalizations, with substance abuse significant for heroin via snorting, with PMHx of HTN transferred from [facility] for opioid OD and CP w/ concerning EKG findings.  At [facility], he was w/ pinpoint pupils, hypotensive, and respiratory depression requiring naloxone x 1 which woke him up.  Found to have an elevated troponin and concerning EKG w/ SHANIA in inferior leads, transferred to [facility] for cardiac workup and management.  Psychiatry consulted to assess for management of anxiety.  Pt states he sniffs 4 bags heroin/week, started 1 year ago, escalating when his pain management doctor stopped giving him opiates.  He denies intentional OD; states he used a small amount of heroin from his same usual provider, denies concurrent ingestions (ie: benzos, alcohol), and denies other substance abuse.  States his  wife does not know about his heroin use.  Denies injection.  Denies current sx opioid withdrawal or history of withdrawal, states he is not interested in substance abuse treatment after discharge.  C/o chronic mild depression without anhedonia, had previous antidepressant trials.  Has increased anxiety 2/2 hospitalization and upcoming CABG.  Denies current or past SIIP/HIIP.  No concerns for suicidality per son.  Dx: Heroin abuse, adjustment disorder.  Agree with NP assessment and plan as above.

## 2019-02-08 NOTE — PROGRESS NOTE ADULT - ASSESSMENT
58M with PMH of HTN, HLD, INOCENCIA (Cr 1.8, GFR 40), current smoker and heroin abuse who was found down at his workplace yesterday after overdosing on heroin at work. He received Narcan and was brought to [facility]. At OSH, EKG concerning for IWSTEMI. Troponin T 0.354. CT head was negative and he was given ASA, Brilinta 180 x1, Plavix 600 mg x1 (unclear why both were given) and started on a heparin gtt. He was transferred to [facility] ED for cath evaluation.     He reports having intermittent chest pain or shortness of breath 1-2 weeks PTA. He currently does have R jaw tingling. His exertional capacity is limited by foot drop for which he wears a brace. He had a stress test about 5 years that he thinks was normal. He smokes 1 pack per day. Occasionally snorts heroin, denies other drug use. His wife does not know about his drug use. Started using heroin last year- twice a week, minimal amount as per patient (" 2 bags")  2/7    VSS    some anxiety,    xanaxr  prn,  Hep Gtt,  LE splint for prior foot drop 58M with PMH of HTN, HLD, INOCENCIA (Cr 1.8, GFR 40), current smoker and heroin abuse who was found down at his workplace yesterday after overdosing on heroin at work. He received Narcan and was brought to [facility]. At OSH, EKG concerning for IWSTEMI. Troponin T 0.354. CT head was negative and he was given ASA, Brilinta 180 x1, Plavix 600 mg x1 (unclear why both were given) and started on a heparin gtt. He was transferred to [facility] ED for cath evaluation.     He reports having intermittent chest pain or shortness of breath 1-2 weeks PTA. He currently does have R jaw tingling. His exertional capacity is limited by foot drop for which he wears a brace. He had a stress test about 5 years that he thinks was normal. He smokes 1 pack per day. Occasionally snorts heroin, denies other drug use. His wife does not know about his drug use. Started using heroin last year- twice a week, minimal amount as per patient (" 2 bags")  2/7    VSS    some anxiety,    xanax prn,  Hep Gtt,  LE splint for prior foot drop  2/8 VSS; psych consulted for hx of heroine use and anxiety  preop workup in progress; [id number] 51 today - repeat on sunday; pt currently on heparin gttp   neg chest pain/ sob  OHS 2/11 with Dr. Christine

## 2019-02-08 NOTE — PROGRESS NOTE ADULT - ASSESSMENT
58M with PMH of HTN, HLD, tobacco use and heroin use who was found down at his workplace yesterday after overdosing on heroin. He received Narcan and was brought to [facility]. At OSH, EKG concerning for IWSTEMI. Troponin T 0.354. CT head was negative and he was given ASA, Brilinta 180 x1, Plavix 600 mg x1 (unclear why both were given) and started on a heparin gtt. He was transferred to [facility] ED for cath evaluation. He is hemodynamically stable and CP free, but HS troponin 1651. LHC showed multivessel disease with plan for OR.   ·	Await OR next week with Dr. Christine  ·	Continue present medical therapy    =======================================================================  Jacoby Joshua MD [facility]    Please page [phone] with questions  On nights and weekend please call the office [phone].

## 2019-02-08 NOTE — PROGRESS NOTE ADULT - PROBLEM SELECTOR PLAN 2
no ss of withdrawal  xanax prn  anxiety pscyh consult today  xanax prn anxiety  pt stable at this time  no s/s of withdrawel

## 2019-02-08 NOTE — BEHAVIORAL HEALTH ASSESSMENT NOTE - SUMMARY
59 yo   male, with 1 adult son (1 dtr passed in 2011), employed as a  for the [employer], domiciled with wife in a private residence in [place], with PPHx of depressive disorder, remote h/o taking Zoloft, Lexapro, and Wellbutrin in the past for depression which was prescribed by his PCP, no h/o SA/SIB, no inpatient hospitalizations, with substance abuse significant for heroin via snorting, with PMHx of HTN transferred from [facility] for opioid OD and CP w/ concerning EKG findings.  At [facility], he was w/ pinpoint pupils, hypotensive, and respiratory depression requiring naloxone x 1 which woke him up.  Found to have an elevated troponin and concerning EKG w/ SHANIA in inferior leads, transferred to [facility] for cardiac workup and management.  Psychiatry consulted to assess for management of anxiety.  Patient seen and evaluated awake and alert, oriented, states feeling anxiety and worsening depression over his impending surgery on Monday, states he was fired from his job after his superiors found heroin in his bag.  States family is unaware of his substance use, states he started using heroin for management of his pain symptoms, states his pain management specialist "cut me off from my pain medications."  Minimizing substance use, states that he "only took the tip of my key" citing taking less than half a bag.  Not inclined to seek treatment for substance use at this time.  Denies S/H/I/I/P, reports still has pleasure in engaging in his previous activities, reports no changes in his sleep or appetite. 59 yo   male, with 1 adult son (1 dtr passed in 2011), employed as a  for the [employer], domiciled with wife in a private residence in [place], with PPHx of depressive disorder, remote h/o taking Zoloft, Lexapro, and Wellbutrin in the past for depression which was prescribed by his PCP, no h/o SA/SIB, no inpatient hospitalizations, with substance abuse significant for heroin via snorting, with PMHx of HTN transferred from [facility] for opioid OD and CP w/ concerning EKG findings.  At [facility], he was w/ pinpoint pupils, hypotensive, and respiratory depression requiring naloxone x 1 which woke him up.  Found to have an elevated troponin and concerning EKG w/ SHANIA in inferior leads, transferred to [facility] for cardiac workup and management.  Psychiatry consulted to assess for management of anxiety.    Patient seen and evaluated awake and alert, oriented, states feeling anxiety and worsening depression over his impending surgery on Monday, states he was fired from his job after his superiors found heroin in his bag.  States family is unaware of his substance use, states he started using heroin for management of his pain symptoms, states his pain management specialist "cut me off from my pain medications."  Denies intentional overdose, states that he "only took the tip of my key" citing taking less than half a bag.  Not inclined to seek treatment for substance use at this time.  Denies S/H/I/I/P, reports still has pleasure in engaging in his previous activities, reports no changes in his sleep or appetite.

## 2019-02-08 NOTE — PROGRESS NOTE ADULT - PROBLEM SELECTOR PLAN 1
hep gtt    OR  monday for CABG continue preop workup in progress  continue asa/ bb heparin gttp   no statin due to hx joint pain   OR  monday for CABG with Dr. Nanci marie daily

## 2019-02-08 NOTE — BEHAVIORAL HEALTH ASSESSMENT NOTE - NSBHCONSULTFOLLOWAFTERCARE_PSY_A_CORE FT
per primary team  May f/u as outpatient at [facility]- [phone]

## 2019-02-08 NOTE — PROGRESS NOTE ADULT - SUBJECTIVE AND OBJECTIVE BOX
Cardiology Follow Up  ==========================================  CC: CAD    HPI: No significant cardiac events overnight.     Review Of Systems:  Negative except as documented above    Medications:    ALPRAZolam   0.25 milliGRAM(s) Oral (02-07-19 @ 19:45)    ALPRAZolam   0.25 milliGRAM(s) Oral (02-08-19 @ 08:36)    ALPRAZolam   0.25 milliGRAM(s) Oral (02-08-19 @ 01:02)    aspirin enteric coated   81 milliGRAM(s) Oral (02-08-19 @ 11:06)    calcium carbonate    500 mG (Tums) Chewable   1 Tablet(s) Chew (02-07-19 @ 19:42)    heparin  Infusion.   1900 Unit(s)/Hr IV Continuous (02-08-19 @ 02:25)   1900 Unit(s)/Hr IV Continuous (02-07-19 @ 19:24)    metoprolol tartrate   25 milliGRAM(s) Oral (02-08-19 @ 05:59)   25 milliGRAM(s) Oral (02-07-19 @ 17:50)    pregabalin   50 milliGRAM(s) Oral (02-08-19 @ 05:59)   50 milliGRAM(s) Oral (02-07-19 @ 17:50)        Telemetry: No significant ectopy     Vital Signs Last 24 Hrs  T(C): 36.7 (08 Feb 2019 12:57), Max: 36.8 (07 Feb 2019 20:16)  T(F): 98.1 (08 Feb 2019 12:57), Max: 98.3 (07 Feb 2019 20:16)  HR: 98 (08 Feb 2019 12:57) (90 - 98)  BP: 113/73 (08 Feb 2019 12:57) (113/73 - 149/92)  BP(mean): --  RR: 17 (08 Feb 2019 12:57) (16 - 18)  SpO2: 96% (08 Feb 2019 12:57) (93% - 96%)  I&O's Summary    07 Feb 2019 07:01  -  08 Feb 2019 07:00  --------------------------------------------------------  IN: 1638 mL / OUT: 1800 mL / NET: -162 mL    08 Feb 2019 07:01  -  08 Feb 2019 15:45  --------------------------------------------------------  IN: 640 mL / OUT: 1050 mL / NET: -410 mL        Physical Exam:  General: [x ] NAD  HEENT: [x ] EOMI [ x] PERRL  Cor: [x ] S1,S2 [x ] RRR [x ] No M/R/G   Lungs: [x ] CTA B/L [ x] Normal effort  Abd: [x ] Soft [x ] Non-tender [x ] +BS  Ext: [ x] No edema [x ] No cyanosis    Labs:                        14.4   7.2   )-----------( 270      ( 08 Feb 2019 05:41 )             40.5     02-08    137  |  104  |  17  ----------------------------<  112<H>  3.6   |  17<L>  |  1.10    Ca    9.1      08 Feb 2019 05:41

## 2019-02-08 NOTE — BEHAVIORAL HEALTH ASSESSMENT NOTE - NSBHCHARTREVIEWINVESTIGATE_PSY_A_CORE FT
< from: 12 Lead ECG (02.06.19 @ 08:03) >    Ventricular Rate 103 BPM    Atrial Rate 103 BPM    P-R Interval 126 ms    QRS Duration 82 ms    Q-T Interval 322 ms    QTC Calculation(Bezet) 421 ms    P Axis 27 degrees    R Axis -39 degrees    T Axis 41 degrees    Diagnosis Line SINUS TACHYCARDIA  LEFT AXIS DEVIATION  INFERIOR INFARCT , AGE UNDETERMINED  ABNORMAL ECG    Confirmed by ATTENDING, ED ([id number]),  AUGUSTUS ZUNIGA ([id number]) on 2/6/2019 8:13:10 AM    < end of copied text >

## 2019-02-08 NOTE — BEHAVIORAL HEALTH ASSESSMENT NOTE - RISK ASSESSMENT
Risk factors:  active substance abuse, chronic pain, losses, not receiving treatment, recent firing from job    Protective factors: no current SIIP/HIIP, no h/o SA/SIB, no h/o psych admissions, good physical health, engaged in work or school, with adult children, domiciled, intact marriage, social supports, positive therapeutic relationship    Overall, pt is chronically a high risk due to substance abuse however at this time is a low risk of harm to self/others.

## 2019-02-08 NOTE — BEHAVIORAL HEALTH ASSESSMENT NOTE - NSBHCONSULTRECOMMENDOTHER_PSY_A_CORE FT
1. No standing psych meds for now.    2. PRN: Melatonin 3mg PO qHS PRN insomnia    3. Check: TSH, B12, folate, RPR, UA 1. No standing psych meds for now.    2. PRN: Melatonin 3mg PO qHS PRN insomnia    3. Check: TSH, B12, folate, RPR, UA, HIV

## 2019-02-08 NOTE — PROGRESS NOTE ADULT - SUBJECTIVE AND OBJECTIVE BOX
VITAL SIGNS    Telemetry:    Vital Signs Last 24 Hrs  T(C): 36.7 (19 @ 05:25), Max: 36.8 (19 @ 20:16)  T(F): 98 (19 @ 05:25), Max: 98.3 (19 @ 20:16)  HR: 93 (19 @ 05:25) (90 - 94)  BP: 131/86 (19 @ 05:25) (127/81 - 149/92)  RR: 16 (19 @ 05:25) (16 - 18)  SpO2: 96% (19 @ 05:25) (93% - 96%)             @ 07:01  -   @ 07:00  --------------------------------------------------------  IN: 1638 mL / OUT: 1800 mL / NET: -162 mL       Daily     Daily Weight in k.8 (2019 09:52)  Admit Wt: Drug Dosing Weight  Height (cm): 177.8 (2019 11:54)  Weight (kg): 95.3 (2019 11:54)  BMI (kg/m2): 30.1 (2019 11:54)  BSA (m2): 2.13 (2019 11:54)      CAPILLARY BLOOD GLUCOSE              ALPRAZolam 0.25 milliGRAM(s) Oral every 8 hours PRN  aspirin enteric coated 81 milliGRAM(s) Oral daily  calcium carbonate    500 mG (Tums) Chewable 1 Tablet(s) Chew every 6 hours PRN  heparin  Infusion. 1300 Unit(s)/Hr IV Continuous <Continuous>  heparin  Injectable 4000 Unit(s) IV Push every 6 hours PRN  metoprolol tartrate 25 milliGRAM(s) Oral two times a day  pregabalin 50 milliGRAM(s) Oral two times a day  sodium chloride 0.9%. 1000 milliLiter(s) IV Continuous <Continuous>      PHYSICAL EXAM    Subjective: "Hi.   Neurology: alert and oriented x 3, nonfocal, no gross deficits  CV : tele:  RSR  Sternal Wound :  CDI with dressing , Stable  Lungs: clear. RR easy, unlabored   Abdomen: soft, nontender, nondistended, positive bowel sounds, bowel movement   Neg N/V/D   :  pt voiding without difficulty   Extremities:   KHAN; edema, neg calf tenderness.   PPP bilaterally      PW:  Chest tubes: VITAL SIGNS    Telemetry:  RSR 80-90   Vital Signs Last 24 Hrs  T(C): 36.7 (19 @ 05:25), Max: 36.8 (19 @ 20:16)  T(F): 98 (19 @ 05:25), Max: 98.3 (19 @ 20:16)  HR: 93 (19 @ 05:25) (90 - 94)  BP: 131/86 (19 @ 05:25) (127/81 - 149/92)  RR: 16 (19 @ 05:25) (16 - 18)  SpO2: 96% (19 @ 05:25) (93% - 96%)             @ 07:01  -   07:00  --------------------------------------------------------  IN: 1638 mL / OUT: 1800 mL / NET: -162 mL       Daily     Daily Weight in k.8 (2019 09:52)  Admit Wt: Drug Dosing Weight  Height (cm): 177.8 (2019 11:54)  Weight (kg): 95.3 (2019 11:54)  BMI (kg/m2): 30.1 (2019 11:54)  BSA (m2): 2.13 (2019 11:54)        ALPRAZolam 0.25 milliGRAM(s) Oral every 8 hours PRN  aspirin enteric coated 81 milliGRAM(s) Oral daily  calcium carbonate    500 mG (Tums) Chewable 1 Tablet(s) Chew every 6 hours PRN  heparin  Infusion. 1300 Unit(s)/Hr IV Continuous <Continuous>  heparin  Injectable 4000 Unit(s) IV Push every 6 hours PRN  metoprolol tartrate 25 milliGRAM(s) Oral two times a day  pregabalin 50 milliGRAM(s) Oral two times a day  sodium chloride 0.9%. 1000 milliLiter(s) IV Continuous <Continuous>      PHYSICAL EXAM    Subjective: "I feel anxious."   Neurology: alert and oriented x 3, nonfocal, no gross deficits  CV : tele:  RSR 70-80   Lungs: clear. RR easy, unlabored   Abdomen: soft, nontender, nondistended, positive bowel sounds, + bowel movement   Neg N/V/D   :  pt voiding without difficulty   Extremities:   KHAN; neg LE edema, neg calf tenderness.   PPP bilaterally; right radial site cdi holly - neg hematoma

## 2019-02-08 NOTE — BEHAVIORAL HEALTH ASSESSMENT NOTE - NSBHCHARTREVIEWLAB_PSY_A_CORE FT
14.4   7.2   )-----------( 270      ( 08 Feb 2019 05:41 )             40.5     02-08    137  |  104  |  17  ----------------------------<  112<H>  3.6   |  17<L>  |  1.10    Ca    9.1      08 Feb 2019 05:41

## 2019-02-09 LAB
ANION GAP SERPL CALC-SCNC: 16 MMOL/L — SIGNIFICANT CHANGE UP (ref 5–17)
APTT BLD: 55 SEC — HIGH (ref 27.5–36.3)
BUN SERPL-MCNC: 17 MG/DL — SIGNIFICANT CHANGE UP (ref 7–23)
CALCIUM SERPL-MCNC: 9 MG/DL — SIGNIFICANT CHANGE UP (ref 8.4–10.5)
CHLORIDE SERPL-SCNC: 107 MMOL/L — SIGNIFICANT CHANGE UP (ref 96–108)
CO2 SERPL-SCNC: 17 MMOL/L — LOW (ref 22–31)
CREAT SERPL-MCNC: 1.06 MG/DL — SIGNIFICANT CHANGE UP (ref 0.5–1.3)
GLUCOSE SERPL-MCNC: 108 MG/DL — HIGH (ref 70–99)
HCT VFR BLD CALC: 41.9 % — SIGNIFICANT CHANGE UP (ref 39–50)
HGB BLD-MCNC: 14.5 G/DL — SIGNIFICANT CHANGE UP (ref 13–17)
MCHC RBC-ENTMCNC: 30.4 PG — SIGNIFICANT CHANGE UP (ref 27–34)
MCHC RBC-ENTMCNC: 34.6 GM/DL — SIGNIFICANT CHANGE UP (ref 32–36)
MCV RBC AUTO: 88.1 FL — SIGNIFICANT CHANGE UP (ref 80–100)
PLATELET # BLD AUTO: 299 K/UL — SIGNIFICANT CHANGE UP (ref 150–400)
POTASSIUM SERPL-MCNC: 3.6 MMOL/L — SIGNIFICANT CHANGE UP (ref 3.5–5.3)
POTASSIUM SERPL-SCNC: 3.6 MMOL/L — SIGNIFICANT CHANGE UP (ref 3.5–5.3)
RBC # BLD: 4.76 M/UL — SIGNIFICANT CHANGE UP (ref 4.2–5.8)
RBC # FLD: 12.8 % — SIGNIFICANT CHANGE UP (ref 10.3–14.5)
SODIUM SERPL-SCNC: 140 MMOL/L — SIGNIFICANT CHANGE UP (ref 135–145)
WBC # BLD: 6.6 K/UL — SIGNIFICANT CHANGE UP (ref 3.8–10.5)
WBC # FLD AUTO: 6.6 K/UL — SIGNIFICANT CHANGE UP (ref 3.8–10.5)

## 2019-02-09 PROCEDURE — 99231 SBSQ HOSP IP/OBS SF/LOW 25: CPT

## 2019-02-09 RX ORDER — POTASSIUM CHLORIDE 20 MEQ
20 PACKET (EA) ORAL ONCE
Qty: 0 | Refills: 0 | Status: COMPLETED | OUTPATIENT
Start: 2019-02-09 | End: 2019-02-09

## 2019-02-09 RX ORDER — LANOLIN ALCOHOL/MO/W.PET/CERES
3 CREAM (GRAM) TOPICAL AT BEDTIME
Qty: 0 | Refills: 0 | Status: DISCONTINUED | OUTPATIENT
Start: 2019-02-09 | End: 2019-02-11

## 2019-02-09 RX ADMIN — HEPARIN SODIUM 1900 UNIT(S)/HR: 5000 INJECTION INTRAVENOUS; SUBCUTANEOUS at 12:34

## 2019-02-09 RX ADMIN — Medication 25 MILLIGRAM(S): at 06:37

## 2019-02-09 RX ADMIN — Medication 50 MILLIGRAM(S): at 06:37

## 2019-02-09 RX ADMIN — Medication 50 MILLIGRAM(S): at 18:25

## 2019-02-09 RX ADMIN — Medication 0.5 MILLIGRAM(S): at 18:25

## 2019-02-09 RX ADMIN — Medication 20 MILLIEQUIVALENT(S): at 12:33

## 2019-02-09 RX ADMIN — Medication 81 MILLIGRAM(S): at 12:33

## 2019-02-09 RX ADMIN — Medication 25 MILLIGRAM(S): at 18:25

## 2019-02-09 RX ADMIN — Medication 0.5 MILLIGRAM(S): at 12:40

## 2019-02-09 RX ADMIN — Medication 0.5 MILLIGRAM(S): at 22:24

## 2019-02-09 NOTE — PROGRESS NOTE ADULT - ASSESSMENT
58M with PMH of HTN, HLD, INOCENCIA (Cr 1.8, GFR 40), current smoker and heroin abuse who was found down at his workplace yesterday after overdosing on heroin at work. He received Narcan and was brought to [facility]. At OSH, EKG concerning for IWSTEMI. Troponin T 0.354. CT head was negative and he was given ASA, Brilinta 180 x1, Plavix 600 mg x1 (unclear why both were given) and started on a heparin gtt. He was transferred to [facility] ED for cath evaluation.     He reports having intermittent chest pain or shortness of breath 1-2 weeks PTA. He currently does have R jaw tingling. His exertional capacity is limited by foot drop for which he wears a brace. He had a stress test about 5 years that he thinks was normal. He smokes 1 pack per day. Occasionally snorts heroin, denies other drug use. His wife does not know about his drug use. Started using heroin last year- twice a week, minimal amount as per patient (" 2 bags")  2/7    VSS    some anxiety,    xanax prn,  Hep Gtt,  LE splint for prior foot drop  2/8 VSS; psych consulted for hx of heroine use and anxiety  preop workup in progress; p2y12 51 today - repeat on sunday; pt currently on heparin gttp   neg chest pain/ sob  [facility] 2/11 with Dr. Christine

## 2019-02-09 NOTE — PROGRESS NOTE ADULT - PROBLEM SELECTOR PLAN 1
continue preop workup in progress  continue asa/ bb heparin gttp   no statin due to hx joint pain   OR  monday for CABG with Dr. Nanci marie daily continue preop workup in progress  repeat p2y12 in am 2/10   continue asa/ bb heparin gttp   no statin due to hx joint pain   OR  monday for CABG with Dr. Christine  showmarisa daily

## 2019-02-09 NOTE — PROGRESS NOTE ADULT - SUBJECTIVE AND OBJECTIVE BOX
VITAL SIGNS    Telemetry:    Vital Signs Last 24 Hrs  T(C): 36.3 (02-09-19 @ 06:31), Max: 36.7 (02-08-19 @ 12:57)  T(F): 97.4 (02-09-19 @ 06:31), Max: 98.1 (02-08-19 @ 12:57)  HR: 83 (02-09-19 @ 06:31) (83 - 98)  BP: 103/71 (02-09-19 @ 06:31) (103/71 - 128/86)  RR: 15 (02-09-19 @ 06:31) (15 - 18)  SpO2: 95% (02-09-19 @ 06:31) (95% - 98%)            02-08 @ 07:01  -  02-09 @ 07:00  --------------------------------------------------------  IN: 1320 mL / OUT: 2500 mL / NET: -1180 mL    02-09 @ 07:01  -  02-09 @ 10:24  --------------------------------------------------------  IN: 400 mL / OUT: 0 mL / NET: 400 mL       Daily     Daily   Admit Wt: Drug Dosing Weight  Height (cm): 177.8 (06 Feb 2019 11:54)  Weight (kg): 95.3 (06 Feb 2019 11:54)  BMI (kg/m2): 30.1 (06 Feb 2019 11:54)  BSA (m2): 2.13 (06 Feb 2019 11:54)            aspirin enteric coated 81 milliGRAM(s) Oral daily  calcium carbonate    500 mG (Tums) Chewable 1 Tablet(s) Chew every 6 hours PRN  heparin  Infusion. 1300 Unit(s)/Hr IV Continuous <Continuous>  heparin  Injectable 4000 Unit(s) IV Push every 6 hours PRN  LORazepam     Tablet 0.5 milliGRAM(s) Oral every 8 hours PRN  melatonin 3 milliGRAM(s) Oral at bedtime PRN  metoprolol tartrate 25 milliGRAM(s) Oral two times a day  potassium chloride    Tablet ER 20 milliEquivalent(s) Oral once  pregabalin 50 milliGRAM(s) Oral two times a day  sodium chloride 0.9%. 1000 milliLiter(s) IV Continuous <Continuous>      PHYSICAL EXAM    Subjective: "Hi.   Neurology: alert and oriented x 3, nonfocal, no gross deficits  CV : tele:  RSR  Sternal Wound :  CDI with dressing , Stable  Lungs: clear. RR easy, unlabored   Abdomen: soft, nontender, nondistended, positive bowel sounds, bowel movement   Neg N/V/D   :  pt voiding without difficulty   Extremities:   KHAN; edema, neg calf tenderness.   PPP bilaterally      PW:  Chest tubes: VITAL SIGNS    Telemetry:  RSR 80-90   Vital Signs Last 24 Hrs  T(C): 36.3 (02-09-19 @ 06:31), Max: 36.7 (02-08-19 @ 12:57)  T(F): 97.4 (02-09-19 @ 06:31), Max: 98.1 (02-08-19 @ 12:57)  HR: 83 (02-09-19 @ 06:31) (83 - 98)  BP: 103/71 (02-09-19 @ 06:31) (103/71 - 128/86)  RR: 15 (02-09-19 @ 06:31) (15 - 18)  SpO2: 95% (02-09-19 @ 06:31) (95% - 98%)            02-08 @ 07:01  -  02-09 @ 07:00  --------------------------------------------------------  IN: 1320 mL / OUT: 2500 mL / NET: -1180 mL    02-09 @ 07:01  -  02-09 @ 10:24  --------------------------------------------------------  IN: 400 mL / OUT: 0 mL / NET: 400 mL       Daily     Daily   Admit Wt: Drug Dosing Weight  Height (cm): 177.8 (06 Feb 2019 11:54)  Weight (kg): 95.3 (06 Feb 2019 11:54)  BMI (kg/m2): 30.1 (06 Feb 2019 11:54)  BSA (m2): 2.13 (06 Feb 2019 11:54)            aspirin enteric coated 81 milliGRAM(s) Oral daily  calcium carbonate    500 mG (Tums) Chewable 1 Tablet(s) Chew every 6 hours PRN  heparin  Infusion. 1300 Unit(s)/Hr IV Continuous <Continuous>  heparin  Injectable 4000 Unit(s) IV Push every 6 hours PRN  LORazepam     Tablet 0.5 milliGRAM(s) Oral every 8 hours PRN  melatonin 3 milliGRAM(s) Oral at bedtime PRN  metoprolol tartrate 25 milliGRAM(s) Oral two times a day  potassium chloride    Tablet ER 20 milliEquivalent(s) Oral once  pregabalin 50 milliGRAM(s) Oral two times a day  sodium chloride 0.9%. 1000 milliLiter(s) IV Continuous <Continuous>      PHYSICAL EXAM    Subjective: "I'm ready for surgery Monday."   Neurology: alert and oriented x 3, nonfocal, no gross deficits  CV : tele:  RSR 80-90   Lungs: clear. RR easy, unlabored   Abdomen: soft, nontender, nondistended, positive bowel sounds, + bowel movement   Neg N/V/D   :  pt voiding without difficulty   Extremities:   KHAN; neg LE edema, neg calf tenderness.   PPP bilaterally; right radial cdi neg hematoma

## 2019-02-09 NOTE — PROGRESS NOTE ADULT - PROBLEM SELECTOR PLAN 2
pscyh consult today  xanax prn anxiety  pt stable at this time  no s/s of withdrawel pscyh consult today  ativan prn anxiety  malatonin prn sleep   pt stable at this time  no s/s of withdrawel

## 2019-02-10 LAB
ALBUMIN SERPL ELPH-MCNC: 3.9 G/DL — SIGNIFICANT CHANGE UP (ref 3.3–5)
ALP SERPL-CCNC: 61 U/L — SIGNIFICANT CHANGE UP (ref 40–120)
ALT FLD-CCNC: 51 U/L — HIGH (ref 10–45)
ANION GAP SERPL CALC-SCNC: 16 MMOL/L — SIGNIFICANT CHANGE UP (ref 5–17)
APTT BLD: 71.4 SEC — HIGH (ref 27.5–36.3)
AST SERPL-CCNC: 57 U/L — HIGH (ref 10–40)
BILIRUB DIRECT SERPL-MCNC: 0.1 MG/DL — SIGNIFICANT CHANGE UP (ref 0–0.2)
BILIRUB INDIRECT FLD-MCNC: 0.3 MG/DL — SIGNIFICANT CHANGE UP (ref 0.2–1)
BILIRUB SERPL-MCNC: 0.4 MG/DL — SIGNIFICANT CHANGE UP (ref 0.2–1.2)
BLD GP AB SCN SERPL QL: NEGATIVE — SIGNIFICANT CHANGE UP
BUN SERPL-MCNC: 18 MG/DL — SIGNIFICANT CHANGE UP (ref 7–23)
CALCIUM SERPL-MCNC: 9.7 MG/DL — SIGNIFICANT CHANGE UP (ref 8.4–10.5)
CHLORIDE SERPL-SCNC: 106 MMOL/L — SIGNIFICANT CHANGE UP (ref 96–108)
CO2 SERPL-SCNC: 18 MMOL/L — LOW (ref 22–31)
CREAT SERPL-MCNC: 1.07 MG/DL — SIGNIFICANT CHANGE UP (ref 0.5–1.3)
GLUCOSE SERPL-MCNC: 111 MG/DL — HIGH (ref 70–99)
HCT VFR BLD CALC: 42.3 % — SIGNIFICANT CHANGE UP (ref 39–50)
HGB BLD-MCNC: 14.9 G/DL — SIGNIFICANT CHANGE UP (ref 13–17)
MCHC RBC-ENTMCNC: 31 PG — SIGNIFICANT CHANGE UP (ref 27–34)
MCHC RBC-ENTMCNC: 35.1 GM/DL — SIGNIFICANT CHANGE UP (ref 32–36)
MCV RBC AUTO: 88.1 FL — SIGNIFICANT CHANGE UP (ref 80–100)
PA ADP PRP-ACNC: 163 PRU — LOW (ref 194–417)
PLATELET # BLD AUTO: 349 K/UL — SIGNIFICANT CHANGE UP (ref 150–400)
POTASSIUM SERPL-MCNC: 3.9 MMOL/L — SIGNIFICANT CHANGE UP (ref 3.5–5.3)
POTASSIUM SERPL-SCNC: 3.9 MMOL/L — SIGNIFICANT CHANGE UP (ref 3.5–5.3)
PROT SERPL-MCNC: 7.3 G/DL — SIGNIFICANT CHANGE UP (ref 6–8.3)
RBC # BLD: 4.8 M/UL — SIGNIFICANT CHANGE UP (ref 4.2–5.8)
RBC # FLD: 13 % — SIGNIFICANT CHANGE UP (ref 10.3–14.5)
RH IG SCN BLD-IMP: POSITIVE — SIGNIFICANT CHANGE UP
SODIUM SERPL-SCNC: 140 MMOL/L — SIGNIFICANT CHANGE UP (ref 135–145)
WBC # BLD: 7.6 K/UL — SIGNIFICANT CHANGE UP (ref 3.8–10.5)
WBC # FLD AUTO: 7.6 K/UL — SIGNIFICANT CHANGE UP (ref 3.8–10.5)

## 2019-02-10 PROCEDURE — 93010 ELECTROCARDIOGRAM REPORT: CPT

## 2019-02-10 RX ORDER — ACETAMINOPHEN 500 MG
975 TABLET ORAL EVERY 12 HOURS
Qty: 0 | Refills: 0 | Status: DISCONTINUED | OUTPATIENT
Start: 2019-02-10 | End: 2019-02-10

## 2019-02-10 RX ORDER — CEFUROXIME AXETIL 250 MG
1500 TABLET ORAL ONCE
Qty: 0 | Refills: 0 | Status: DISCONTINUED | OUTPATIENT
Start: 2019-02-10 | End: 2019-02-11

## 2019-02-10 RX ORDER — CHLORHEXIDINE GLUCONATE 213 G/1000ML
15 SOLUTION TOPICAL ONCE
Qty: 0 | Refills: 0 | Status: COMPLETED | OUTPATIENT
Start: 2019-02-10 | End: 2019-02-11

## 2019-02-10 RX ORDER — CHLORHEXIDINE GLUCONATE 213 G/1000ML
1 SOLUTION TOPICAL ONCE
Qty: 0 | Refills: 0 | Status: COMPLETED | OUTPATIENT
Start: 2019-02-10 | End: 2019-02-10

## 2019-02-10 RX ADMIN — Medication 0.5 MILLIGRAM(S): at 17:42

## 2019-02-10 RX ADMIN — Medication 81 MILLIGRAM(S): at 12:30

## 2019-02-10 RX ADMIN — Medication 50 MILLIGRAM(S): at 06:17

## 2019-02-10 RX ADMIN — Medication 0.5 MILLIGRAM(S): at 22:10

## 2019-02-10 RX ADMIN — HEPARIN SODIUM 1900 UNIT(S)/HR: 5000 INJECTION INTRAVENOUS; SUBCUTANEOUS at 07:57

## 2019-02-10 RX ADMIN — Medication 0.5 MILLIGRAM(S): at 06:20

## 2019-02-10 RX ADMIN — Medication 0.5 MILLIGRAM(S): at 14:24

## 2019-02-10 RX ADMIN — CHLORHEXIDINE GLUCONATE 1 APPLICATION(S): 213 SOLUTION TOPICAL at 20:39

## 2019-02-10 RX ADMIN — Medication 25 MILLIGRAM(S): at 17:09

## 2019-02-10 RX ADMIN — Medication 50 MILLIGRAM(S): at 17:16

## 2019-02-10 RX ADMIN — Medication 25 MILLIGRAM(S): at 06:17

## 2019-02-10 NOTE — PROGRESS NOTE ADULT - ASSESSMENT
58M with PMH of HTN, HLD, INOCENCIA (Cr 1.8, GFR 40), current smoker and heroin abuse who was found down at his workplace yesterday after overdosing on heroin at work. He received Narcan and was brought to [facility]. At OSH, EKG concerning for IWSTEMI. Troponin T 0.354. CT head was negative and he was given ASA, Brilinta 180 x1, Plavix 600 mg x1 (unclear why both were given) and started on a heparin gtt. He was transferred to [facility] ED for cath evaluation.     He reports having intermittent chest pain or shortness of breath 1-2 weeks PTA. He currently does have R jaw tingling. His exertional capacity is limited by foot drop for which he wears a brace. He had a stress test about 5 years that he thinks was normal. He smokes 1 pack per day. Occasionally snorts heroin, denies other drug use. His wife does not know about his drug use. Started using heroin last year- twice a week, minimal amount as per patient (" 2 bags")  2/7    VSS    some anxiety,    xanax prn,  Hep Gtt,  LE splint for prior foot drop  2/8 VSS; psych consulted for hx of heroine use and anxiety  preop workup in progress; p2y12 51 today - repeat on sunday; pt currently on heparin gttp   neg chest pain/ sob  [facility] 2/11 with Dr. Christine -2nd case  pt with trepidations re: [facility] - emotional support given - pre-op teaching provided

## 2019-02-10 NOTE — PROGRESS NOTE ADULT - SUBJECTIVE AND OBJECTIVE BOX
Cardiac Surgery Pre-op Note:  CC: Patient is a 58y old  Male who presents with a chief complaint of CAD (08 Feb 2019 15:44)      Referring Physician:       DR. Lang	                                                                                      Surgeon: Dr. rose mary Christine  Procedure: (Date) (Procedure) CABG x 3    Allergies No Known Allergies    HPI: 58M with PMH of HTN, HLD, INOCENCIA (Cr 1.8, GFR 40), current smoker and heroin abuse who was found down at his workplace yesterday after overdosing on heroin at work. He received Narcan and was brought to [facility]. At OSH, EKG concerning for IWSTEMI. Troponin T 0.354. CT head was negative and he was given ASA, Brilinta 180 x1, Plavix 600 mg x1 (unclear why both were given) and started on a heparin gtt. He was transferred to [facility] ED for cath evaluation.     He reports having intermittent chest pain or shortness of breath 1-2 weeks PTA. He currently does have R jaw tingling. His exertional capacity is limited by foot drop for which he wears a brace. He had a stress test about 5 years that he thinks was normal. He smokes 1 pack per day. Occasionally snorts heroin, denies other drug use. His wife does not know about his drug use. Started using heroin last year- twice a week, minimal amount as per patient (" 2 bags") (06 Feb 2019 11:54)    PAST MEDICAL & SURGICAL HISTORY:  HLD (hyperlipidemia)  Hypertension  Opioid abuse  History of total right hip replacement    MEDICATIONS  (STANDING):  aspirin enteric coated 81 milliGRAM(s) Oral daily  heparin  Infusion. 1300 Unit(s)/Hr (13 mL/Hr) IV Continuous <Continuous>  metoprolol tartrate 25 milliGRAM(s) Oral two times a day  pregabalin 50 milliGRAM(s) Oral two times a day  sodium chloride 0.9%. 1000 milliLiter(s) (250 mL/Hr) IV Continuous <Continuous>    MEDICATIONS  (PRN):  calcium carbonate    500 mG (Tums) Chewable 1 Tablet(s) Chew every 6 hours PRN Heartburn  heparin  Injectable 4000 Unit(s) IV Push every 6 hours PRN For aPTT less than 40  LORazepam     Tablet 0.5 milliGRAM(s) Oral every 8 hours PRN Anxiety  melatonin 3 milliGRAM(s) Oral at bedtime PRN Insomnia  Labs:                        14.9   7.6   )-----------( 349      ( 10 Feb 2019 06:50 )             42.3     140  |  106  |  18  ----------------------------<  111<H>  3.9   |  18<L>  |  1.07    Ca    9.7      10 Feb 2019 06:50    PTT - ( 10 Feb 2019 06:50 )  PTT:71.4 sec  Blood Type: ABO Interpretation: O (02-06 @ 11:08)  HGB A1C: Hemoglobin A1C, Whole Blood: 5.8 % (02-07 @ 00:23)  Prealbumin:   Pro-BNP: Serum Pro-Brain Natriuretic Peptide: 4682 pg/mL (02-06 @ 19:06)  Thyroid Panel: 02-07 @ 00:23/0.84  --/6.0/60  MRSA:  / MSSA:   CXR:     EKG:    Carotid Duplex:      PFT's:    Echocardiogram:    Cardiac catheterization:    Gen: WN/WD NAD  Neuro: AAOx3, nonfocal  Pulm: CTA B/L  CV: RRR, S1S2 +systolic murmur  Abd: Soft, NT, ND +BS  Ext: No edema, + peripheral pulses      Pt has AICD/PPM [ ] Yes  [x ] No             Brand Name:  Pre-op Beta Blocker ordered within 24 hrs of surgery (CABG ONLY)?  [x ] Yes  [ ] No  If not, Why?  Type & Cross  [ ] Yes  [ ] No  NPO after Midnight [x ] Yes  [ ] No  Pre-op ABX ordered, to be taped on chart:  [ x] Yes  [ ] No     Hibiclens/Peridex ordered [x ] Yes  [ ] No  Intraop on Hold: PRBCs, CXR, TONY [x ]   Consent obtained  [x ] Yes  [ ] No Cardiac Surgery Pre-op Note:  CC: Patient is a 58y old  Male who presents with a chief complaint of CAD (08 Feb 2019 15:44)      Referring Physician:       DR. Lang	                                                                                      Surgeon: Dr. rose mary Christine  Procedure: (Date) (Procedure) CABG x 3    Allergies No Known Allergies    HPI: 58M with PMH of HTN, HLD, INOCENCIA (Cr 1.8, GFR 40), current smoker and heroin abuse who was found down at his workplace yesterday after overdosing on heroin at work. He received Narcan and was brought to [facility]. At OSH, EKG concerning for IWSTEMI. Troponin T 0.354. CT head was negative and he was given ASA, Brilinta 180 x1, Plavix 600 mg x1 (unclear why both were given) and started on a heparin gtt. He was transferred to [facility] ED for cath evaluation.     He reports having intermittent chest pain or shortness of breath 1-2 weeks PTA. He currently does have R jaw tingling. His exertional capacity is limited by foot drop for which he wears a brace. He had a stress test about 5 years that he thinks was normal. He smokes 1 pack per day. Occasionally snorts heroin, denies other drug use. His wife does not know about his drug use. Started using heroin last year- twice a week, minimal amount as per patient (" 2 bags") (06 Feb 2019 11:54)    PAST MEDICAL & SURGICAL HISTORY:  HLD (hyperlipidemia)  Hypertension  Opioid abuse  History of total right hip replacement    MEDICATIONS  (STANDING):  aspirin enteric coated 81 milliGRAM(s) Oral daily  heparin  Infusion. 1300 Unit(s)/Hr (13 mL/Hr) IV Continuous <Continuous>  metoprolol tartrate 25 milliGRAM(s) Oral two times a day  pregabalin 50 milliGRAM(s) Oral two times a day  sodium chloride 0.9%. 1000 milliLiter(s) (250 mL/Hr) IV Continuous <Continuous>    MEDICATIONS  (PRN):  calcium carbonate    500 mG (Tums) Chewable 1 Tablet(s) Chew every 6 hours PRN Heartburn  heparin  Injectable 4000 Unit(s) IV Push every 6 hours PRN For aPTT less than 40  LORazepam     Tablet 0.5 milliGRAM(s) Oral every 8 hours PRN Anxiety  melatonin 3 milliGRAM(s) Oral at bedtime PRN Insomnia  Labs:                        14.9   7.6   )-----------( 349      ( 10 Feb 2019 06:50 )             42.3     140  |  106  |  18  ----------------------------<  111<H>  3.9   |  18<L>  |  1.07    Ca    9.7      10 Feb 2019 06:50    PTT - ( 10 Feb 2019 06:50 )  PTT:71.4 sec  Blood Type: ABO Interpretation: O (02-06 @ 11:08)  HGB A1C: Hemoglobin A1C, Whole Blood: 5.8 % (02-07 @ 00:23)  Prealbumin:   Pro-BNP: Serum Pro-Brain Natriuretic Peptide: 4682 pg/mL (02-06 @ 19:06)  Thyroid Panel: 02-07 @ 00:23/0.84  --/6.0/60  MRSA:  / MSSA:   CXR: < from: Xray Chest 1 View- PORTABLE-Routine (02.07.19 @ 08:40) >  IMPRESSION:    1.  The lungs are clear.    EKG: < from: 12 Lead ECG (02.06.19 @ 08:03) >  Diagnosis Line SINUS TACHYCARDIA  LEFT AXIS DEVIATION  INFERIOR INFARCT , AGE UNDETERMINED  ABNORMAL ECG    PFT's:    Echocardiogram:  < from: TTE with Doppler (w/Cont) (02.06.19 @ 17:59) >  Conclusions:  1. Mitral annular calcification, otherwise normal mitral  valve. Minimal mitral regurgitation.  2. Aortic valve not well visualized; appears calcified. No  aortic valve regurgitation seen.  3. Endocardial visualization enhanced with intravenous  injection of Ultrasonic Enhancing Agent (Definity).  Moderate segmental left ventricular systolic dysfunction.  The mid inferolateral wall, the mid anterior wall, the mid  inferoseptum, and the apical cap are hypokinetic. The  apical inferior wall, the apical anterior wall, the apical  septum, and the apical lateral wall are akinetic.  4. Mild diastolic dysfunction (Stage I).  5. The right ventricle is not well visualized; grossly  normal right ventricular systolic function.  6. Normal pericardium with no pericardial effusion.    Cardiac catheterization:  < from: Cardiac Cath Lab - Adult (02.06.19 @ 12:43) >  CORONARY VESSELS: The coronary circulation is right dominant.  LM:   --  Distal left main: There was a 10 % stenosis.  LAD:   --  Proximal LAD: There was a 40 % stenosis.  --  Mid LAD: There was a 100 % stenosis.  --  D1: There was a 40 % stenosis.  CX:   --  Proximal circumflex: There was a 90 % stenosis. The lesion was  associated with a moderate filling defect consistent with thrombus. There  was EVY grade 3 flow through the vessel (brisk flow).  --  OM1: There was a 100 % stenosis distal vessel.  RCA:   --  Mid RCA: There was a 70 % stenosis.    Gen: WN/WD NAD  Neuro: AAOx3, nonfocal-right foot drop   Pulm: CTA B/L  CV: RRR, S1S2 +systolic murmur  Abd: Soft, NT, ND +BS  Ext: No edema, + peripheral pulses    Pt has AICD/PPM [ ] Yes  [x ] No             Brand Name:  Pre-op Beta Blocker ordered within 24 hrs of surgery (CABG ONLY)?  [x ] Yes  [ ] No  If not, Why?  Type & Cross  [x ] Yes  [ ] No  NPO after Midnight [x ] Yes  [ ] No  Pre-op ABX ordered, to be taped on chart:  [ x] Yes  [ ] No     Hibiclens/Peridex ordered [x ] Yes  [ ] No  Intraop on Hold: PRBCs, CXR, TONY [x ]   Consent obtained  [x ] Yes  [ ] No

## 2019-02-10 NOTE — PRE-ANESTHESIA EVALUATION ADULT - NSANTHOSAYNRD_GEN_A_CORE
No. ADONIS screening performed.  STOP BANG Legend: 0-2 = LOW Risk; 3-4 = INTERMEDIATE Risk; 5-8 = HIGH Risk

## 2019-02-10 NOTE — PRE-ANESTHESIA EVALUATION ADULT - NSANTHPMHFT_GEN_ALL_CORE
57yo man with hx of heroin abuse, depression/anxiety, HTN, smoking presents with NSTEMI and INOCENCIA (now resolved). 59yo man with hx of heroin abuse, depression/anxiety, HTN, smoking, R foot drop presents with STEMI and INOCENCIA (now resolved).

## 2019-02-11 ENCOUNTER — APPOINTMENT (OUTPATIENT)
Dept: CARDIOTHORACIC SURGERY | Facility: HOSPITAL | Age: 59
End: 2019-02-11

## 2019-02-11 LAB
ALBUMIN SERPL ELPH-MCNC: 3.2 G/DL — LOW (ref 3.3–5)
ALP SERPL-CCNC: 29 U/L — LOW (ref 40–120)
ALT FLD-CCNC: 25 U/L — SIGNIFICANT CHANGE UP (ref 10–45)
ANION GAP SERPL CALC-SCNC: 14 MMOL/L — SIGNIFICANT CHANGE UP (ref 5–17)
APTT BLD: 59.4 SEC — HIGH (ref 27.5–36.3)
AST SERPL-CCNC: 32 U/L — SIGNIFICANT CHANGE UP (ref 10–40)
BASE EXCESS BLDV CALC-SCNC: -0.7 MMOL/L — SIGNIFICANT CHANGE UP (ref -2–2)
BASE EXCESS BLDV CALC-SCNC: -1.6 MMOL/L — SIGNIFICANT CHANGE UP (ref -2–2)
BASE EXCESS BLDV CALC-SCNC: -2.2 MMOL/L — LOW (ref -2–2)
BILIRUB SERPL-MCNC: 0.4 MG/DL — SIGNIFICANT CHANGE UP (ref 0.2–1.2)
BLOOD GAS VENOUS - CREATININE: SIGNIFICANT CHANGE UP MG/DL (ref 0.5–1.3)
BLOOD GAS VENOUS - CREATININE: SIGNIFICANT CHANGE UP MG/DL (ref 0.5–1.3)
BUN SERPL-MCNC: 15 MG/DL — SIGNIFICANT CHANGE UP (ref 7–23)
CA-I SERPL-SCNC: 0.95 MMOL/L — LOW (ref 1.12–1.3)
CA-I SERPL-SCNC: 1.03 MMOL/L — LOW (ref 1.12–1.3)
CA-I SERPL-SCNC: 1.04 MMOL/L — LOW (ref 1.12–1.3)
CALCIUM SERPL-MCNC: 7.3 MG/DL — LOW (ref 8.4–10.5)
CHLORIDE BLDV-SCNC: SIGNIFICANT CHANGE UP MMOL/L (ref 96–108)
CHLORIDE BLDV-SCNC: SIGNIFICANT CHANGE UP MMOL/L (ref 96–108)
CHLORIDE SERPL-SCNC: 106 MMOL/L — SIGNIFICANT CHANGE UP (ref 96–108)
CK MB CFR SERPL CALC: 15.9 NG/ML — HIGH (ref 0–6.7)
CK SERPL-CCNC: 169 U/L — SIGNIFICANT CHANGE UP (ref 30–200)
CO2 SERPL-SCNC: 19 MMOL/L — LOW (ref 22–31)
CREAT SERPL-MCNC: 0.94 MG/DL — SIGNIFICANT CHANGE UP (ref 0.5–1.3)
FIBRINOGEN PPP-MCNC: 408 MG/DL — SIGNIFICANT CHANGE UP (ref 350–510)
GAS PNL BLDA: SIGNIFICANT CHANGE UP
GAS PNL BLDV: 135 MMOL/L — LOW (ref 136–145)
GAS PNL BLDV: 136 MMOL/L — SIGNIFICANT CHANGE UP (ref 136–145)
GAS PNL BLDV: 136 MMOL/L — SIGNIFICANT CHANGE UP (ref 136–145)
GAS PNL BLDV: SIGNIFICANT CHANGE UP
GLUCOSE BLDC GLUCOMTR-MCNC: 119 MG/DL — HIGH (ref 70–99)
GLUCOSE BLDV-MCNC: 120 MG/DL — HIGH (ref 70–99)
GLUCOSE BLDV-MCNC: 122 MG/DL — HIGH (ref 70–99)
GLUCOSE BLDV-MCNC: 125 MG/DL — HIGH (ref 70–99)
GLUCOSE SERPL-MCNC: 170 MG/DL — HIGH (ref 70–99)
HCO3 BLDV-SCNC: 23 MMOL/L — SIGNIFICANT CHANGE UP (ref 21–29)
HCO3 BLDV-SCNC: 24 MMOL/L — SIGNIFICANT CHANGE UP (ref 21–29)
HCO3 BLDV-SCNC: 24 MMOL/L — SIGNIFICANT CHANGE UP (ref 21–29)
HCT VFR BLDA CALC: 27 % — LOW (ref 39–50)
HCT VFR BLDA CALC: 29 % — LOW (ref 39–50)
HCT VFR BLDA CALC: 33 % — LOW (ref 39–50)
HGB BLD CALC-MCNC: 10.7 G/DL — LOW (ref 13–17)
HGB BLD CALC-MCNC: 8.6 G/DL — LOW (ref 13–17)
HGB BLD CALC-MCNC: 9.5 G/DL — LOW (ref 13–17)
HOROWITZ INDEX BLDV+IHG-RTO: 0 — SIGNIFICANT CHANGE UP
HOROWITZ INDEX BLDV+IHG-RTO: 0 — SIGNIFICANT CHANGE UP
HOROWITZ INDEX BLDV+IHG-RTO: 60 — SIGNIFICANT CHANGE UP
INR BLD: 1.24 RATIO — HIGH (ref 0.88–1.16)
LACTATE BLDV-MCNC: 0.8 MMOL/L — SIGNIFICANT CHANGE UP (ref 0.7–2)
LACTATE BLDV-MCNC: 0.8 MMOL/L — SIGNIFICANT CHANGE UP (ref 0.7–2)
LACTATE BLDV-MCNC: 1 MMOL/L — SIGNIFICANT CHANGE UP (ref 0.7–2)
PCO2 BLDV: 41 MMHG — SIGNIFICANT CHANGE UP (ref 35–50)
PCO2 BLDV: 42 MMHG — SIGNIFICANT CHANGE UP (ref 35–50)
PCO2 BLDV: 51 MMHG — HIGH (ref 35–50)
PH BLDV: 7.3 — LOW (ref 7.35–7.45)
PH BLDV: 7.36 — SIGNIFICANT CHANGE UP (ref 7.35–7.45)
PH BLDV: 7.37 — SIGNIFICANT CHANGE UP (ref 7.35–7.45)
PO2 BLDV: 49 MMHG — HIGH (ref 25–45)
PO2 BLDV: 60 MMHG — HIGH (ref 25–45)
PO2 BLDV: 60 MMHG — HIGH (ref 25–45)
POTASSIUM BLDV-SCNC: 4 MMOL/L — SIGNIFICANT CHANGE UP (ref 3.5–5)
POTASSIUM BLDV-SCNC: 4.9 MMOL/L — SIGNIFICANT CHANGE UP (ref 3.5–5)
POTASSIUM BLDV-SCNC: 5.1 MMOL/L — HIGH (ref 3.5–5)
POTASSIUM SERPL-MCNC: 4.3 MMOL/L — SIGNIFICANT CHANGE UP (ref 3.5–5.3)
POTASSIUM SERPL-SCNC: 4.3 MMOL/L — SIGNIFICANT CHANGE UP (ref 3.5–5.3)
PROT SERPL-MCNC: 4.6 G/DL — LOW (ref 6–8.3)
PROTHROM AB SERPL-ACNC: 14.2 SEC — HIGH (ref 10–12.9)
SAO2 % BLDV: 79 % — SIGNIFICANT CHANGE UP (ref 67–88)
SAO2 % BLDV: 85 % — SIGNIFICANT CHANGE UP (ref 67–88)
SAO2 % BLDV: 87 % — SIGNIFICANT CHANGE UP (ref 67–88)
SODIUM SERPL-SCNC: 139 MMOL/L — SIGNIFICANT CHANGE UP (ref 135–145)
TROPONIN T, HIGH SENSITIVITY RESULT: 1685 NG/L — HIGH (ref 0–51)

## 2019-02-11 PROCEDURE — 33533 CABG ARTERIAL SINGLE: CPT | Mod: AS

## 2019-02-11 PROCEDURE — 93010 ELECTROCARDIOGRAM REPORT: CPT

## 2019-02-11 PROCEDURE — 33518 CABG ARTERY-VEIN TWO: CPT | Mod: AS

## 2019-02-11 PROCEDURE — 33518 CABG ARTERY-VEIN TWO: CPT

## 2019-02-11 PROCEDURE — 99291 CRITICAL CARE FIRST HOUR: CPT

## 2019-02-11 PROCEDURE — 71045 X-RAY EXAM CHEST 1 VIEW: CPT | Mod: 26

## 2019-02-11 PROCEDURE — 33533 CABG ARTERIAL SINGLE: CPT

## 2019-02-11 RX ORDER — OXYCODONE HYDROCHLORIDE 5 MG/1
5 TABLET ORAL EVERY 6 HOURS
Qty: 0 | Refills: 0 | Status: DISCONTINUED | OUTPATIENT
Start: 2019-02-11 | End: 2019-02-15

## 2019-02-11 RX ORDER — PANTOPRAZOLE SODIUM 20 MG/1
40 TABLET, DELAYED RELEASE ORAL DAILY
Qty: 0 | Refills: 0 | Status: DISCONTINUED | OUTPATIENT
Start: 2019-02-11 | End: 2019-02-12

## 2019-02-11 RX ORDER — SODIUM CHLORIDE 9 MG/ML
500 INJECTION, SOLUTION INTRAVENOUS ONCE
Qty: 0 | Refills: 0 | Status: COMPLETED | OUTPATIENT
Start: 2019-02-11 | End: 2019-02-11

## 2019-02-11 RX ORDER — ASPIRIN/CALCIUM CARB/MAGNESIUM 324 MG
300 TABLET ORAL ONCE
Qty: 0 | Refills: 0 | Status: DISCONTINUED | OUTPATIENT
Start: 2019-02-11 | End: 2019-02-11

## 2019-02-11 RX ORDER — NICARDIPINE HYDROCHLORIDE 30 MG/1
5 CAPSULE, EXTENDED RELEASE ORAL
Qty: 40 | Refills: 0 | Status: DISCONTINUED | OUTPATIENT
Start: 2019-02-11 | End: 2019-02-12

## 2019-02-11 RX ORDER — CEFUROXIME AXETIL 250 MG
1500 TABLET ORAL EVERY 8 HOURS
Qty: 0 | Refills: 0 | Status: COMPLETED | OUTPATIENT
Start: 2019-02-11 | End: 2019-02-12

## 2019-02-11 RX ORDER — POTASSIUM CHLORIDE 20 MEQ
10 PACKET (EA) ORAL
Qty: 0 | Refills: 0 | Status: COMPLETED | OUTPATIENT
Start: 2019-02-11 | End: 2019-02-11

## 2019-02-11 RX ORDER — SODIUM CHLORIDE 9 MG/ML
1000 INJECTION INTRAMUSCULAR; INTRAVENOUS; SUBCUTANEOUS
Qty: 0 | Refills: 0 | Status: DISCONTINUED | OUTPATIENT
Start: 2019-02-11 | End: 2019-02-12

## 2019-02-11 RX ORDER — INSULIN HUMAN 100 [IU]/ML
2 INJECTION, SOLUTION SUBCUTANEOUS
Qty: 50 | Refills: 0 | Status: DISCONTINUED | OUTPATIENT
Start: 2019-02-11 | End: 2019-02-11

## 2019-02-11 RX ORDER — FAMOTIDINE 10 MG/ML
20 INJECTION INTRAVENOUS EVERY 12 HOURS
Qty: 0 | Refills: 0 | Status: DISCONTINUED | OUTPATIENT
Start: 2019-02-11 | End: 2019-02-11

## 2019-02-11 RX ORDER — HYDROMORPHONE HYDROCHLORIDE 2 MG/ML
0.5 INJECTION INTRAMUSCULAR; INTRAVENOUS; SUBCUTANEOUS ONCE
Qty: 0 | Refills: 0 | Status: DISCONTINUED | OUTPATIENT
Start: 2019-02-11 | End: 2019-02-11

## 2019-02-11 RX ORDER — ONDANSETRON 8 MG/1
4 TABLET, FILM COATED ORAL ONCE
Qty: 0 | Refills: 0 | Status: DISCONTINUED | OUTPATIENT
Start: 2019-02-11 | End: 2019-02-11

## 2019-02-11 RX ORDER — ACETAMINOPHEN 500 MG
650 TABLET ORAL EVERY 6 HOURS
Qty: 0 | Refills: 0 | Status: DISCONTINUED | OUTPATIENT
Start: 2019-02-11 | End: 2019-02-16

## 2019-02-11 RX ORDER — ACETAMINOPHEN 500 MG
1000 TABLET ORAL ONCE
Refills: 0 | Status: COMPLETED | OUTPATIENT
Start: 2019-02-11 | End: 2019-02-11

## 2019-02-11 RX ORDER — MEPERIDINE HYDROCHLORIDE 50 MG/ML
25 INJECTION INTRAMUSCULAR; INTRAVENOUS; SUBCUTANEOUS ONCE
Qty: 0 | Refills: 0 | Status: DISCONTINUED | OUTPATIENT
Start: 2019-02-11 | End: 2019-02-11

## 2019-02-11 RX ORDER — POTASSIUM CHLORIDE 20 MEQ
10 PACKET (EA) ORAL
Qty: 0 | Refills: 0 | Status: DISCONTINUED | OUTPATIENT
Start: 2019-02-11 | End: 2019-02-12

## 2019-02-11 RX ORDER — VASOPRESSIN 20 [USP'U]/ML
0.07 INJECTION INTRAVENOUS
Qty: 100 | Refills: 0 | Status: DISCONTINUED | OUTPATIENT
Start: 2019-02-11 | End: 2019-02-11

## 2019-02-11 RX ORDER — SODIUM CHLORIDE 9 MG/ML
500 INJECTION, SOLUTION INTRAVENOUS ONCE
Refills: 0 | Status: COMPLETED | OUTPATIENT
Start: 2019-02-11 | End: 2019-02-11

## 2019-02-11 RX ORDER — NOREPINEPHRINE BITARTRATE/D5W 8 MG/250ML
0.08 PLASTIC BAG, INJECTION (ML) INTRAVENOUS
Qty: 8 | Refills: 0 | Status: DISCONTINUED | OUTPATIENT
Start: 2019-02-11 | End: 2019-02-11

## 2019-02-11 RX ORDER — DEXTROSE 50 % IN WATER 50 %
25 SYRINGE (ML) INTRAVENOUS
Qty: 0 | Refills: 0 | Status: DISCONTINUED | OUTPATIENT
Start: 2019-02-11 | End: 2019-02-16

## 2019-02-11 RX ORDER — POLYETHYLENE GLYCOL 3350 17 G/17G
17 POWDER, FOR SOLUTION ORAL DAILY
Qty: 0 | Refills: 0 | Status: DISCONTINUED | OUTPATIENT
Start: 2019-02-11 | End: 2019-02-16

## 2019-02-11 RX ORDER — CHLORHEXIDINE GLUCONATE 213 G/1000ML
5 SOLUTION TOPICAL EVERY 4 HOURS
Qty: 0 | Refills: 0 | Status: DISCONTINUED | OUTPATIENT
Start: 2019-02-11 | End: 2019-02-11

## 2019-02-11 RX ORDER — ASPIRIN/CALCIUM CARB/MAGNESIUM 324 MG
81 TABLET ORAL DAILY
Qty: 0 | Refills: 0 | Status: DISCONTINUED | OUTPATIENT
Start: 2019-02-11 | End: 2019-02-16

## 2019-02-11 RX ORDER — DEXMEDETOMIDINE HYDROCHLORIDE IN 0.9% SODIUM CHLORIDE 4 UG/ML
0.3 INJECTION INTRAVENOUS
Qty: 200 | Refills: 0 | Status: DISCONTINUED | OUTPATIENT
Start: 2019-02-11 | End: 2019-02-11

## 2019-02-11 RX ORDER — HYDROMORPHONE HYDROCHLORIDE 2 MG/ML
1 INJECTION INTRAMUSCULAR; INTRAVENOUS; SUBCUTANEOUS ONCE
Qty: 0 | Refills: 0 | Status: DISCONTINUED | OUTPATIENT
Start: 2019-02-11 | End: 2019-02-11

## 2019-02-11 RX ORDER — INSULIN HUMAN 100 [IU]/ML
2 INJECTION, SOLUTION SUBCUTANEOUS
Qty: 100 | Refills: 0 | Status: DISCONTINUED | OUTPATIENT
Start: 2019-02-11 | End: 2019-02-15

## 2019-02-11 RX ORDER — DEXTROSE 50 % IN WATER 50 %
50 SYRINGE (ML) INTRAVENOUS
Qty: 0 | Refills: 0 | Status: DISCONTINUED | OUTPATIENT
Start: 2019-02-11 | End: 2019-02-16

## 2019-02-11 RX ADMIN — Medication 50 MILLIGRAM(S): at 05:48

## 2019-02-11 RX ADMIN — PANTOPRAZOLE SODIUM 40 MILLIGRAM(S): 20 TABLET, DELAYED RELEASE ORAL at 17:18

## 2019-02-11 RX ADMIN — Medication 100 MILLIEQUIVALENT(S): at 15:31

## 2019-02-11 RX ADMIN — Medication 100 MILLIGRAM(S): at 23:37

## 2019-02-11 RX ADMIN — HYDROMORPHONE HYDROCHLORIDE 1 MILLIGRAM(S): 2 INJECTION INTRAMUSCULAR; INTRAVENOUS; SUBCUTANEOUS at 23:52

## 2019-02-11 RX ADMIN — SODIUM CHLORIDE 3000 MILLILITER(S): 9 INJECTION, SOLUTION INTRAVENOUS at 16:11

## 2019-02-11 RX ADMIN — HYDROMORPHONE HYDROCHLORIDE 0.5 MILLIGRAM(S): 2 INJECTION INTRAMUSCULAR; INTRAVENOUS; SUBCUTANEOUS at 14:45

## 2019-02-11 RX ADMIN — OXYCODONE HYDROCHLORIDE 5 MILLIGRAM(S): 5 TABLET ORAL at 20:34

## 2019-02-11 RX ADMIN — HYDROMORPHONE HYDROCHLORIDE 0.5 MILLIGRAM(S): 2 INJECTION INTRAMUSCULAR; INTRAVENOUS; SUBCUTANEOUS at 16:40

## 2019-02-11 RX ADMIN — Medication 100 MILLIGRAM(S): at 16:10

## 2019-02-11 RX ADMIN — Medication 1000 MILLIGRAM(S): at 18:25

## 2019-02-11 RX ADMIN — HYDROMORPHONE HYDROCHLORIDE 0.5 MILLIGRAM(S): 2 INJECTION INTRAMUSCULAR; INTRAVENOUS; SUBCUTANEOUS at 15:15

## 2019-02-11 RX ADMIN — Medication 25 MILLIGRAM(S): at 05:48

## 2019-02-11 RX ADMIN — VASOPRESSIN 4 UNIT(S)/MIN: 20 INJECTION INTRAVENOUS at 18:54

## 2019-02-11 RX ADMIN — HYDROMORPHONE HYDROCHLORIDE 0.5 MILLIGRAM(S): 2 INJECTION INTRAMUSCULAR; INTRAVENOUS; SUBCUTANEOUS at 23:11

## 2019-02-11 RX ADMIN — Medication 100 MILLIEQUIVALENT(S): at 14:57

## 2019-02-11 RX ADMIN — OXYCODONE HYDROCHLORIDE 5 MILLIGRAM(S): 5 TABLET ORAL at 22:35

## 2019-02-11 RX ADMIN — CHLORHEXIDINE GLUCONATE 5 MILLILITER(S): 213 SOLUTION TOPICAL at 17:18

## 2019-02-11 RX ADMIN — HYDROMORPHONE HYDROCHLORIDE 0.5 MILLIGRAM(S): 2 INJECTION INTRAMUSCULAR; INTRAVENOUS; SUBCUTANEOUS at 21:30

## 2019-02-11 RX ADMIN — SODIUM CHLORIDE 10 MILLILITER(S): 9 INJECTION INTRAMUSCULAR; INTRAVENOUS; SUBCUTANEOUS at 18:59

## 2019-02-11 RX ADMIN — CHLORHEXIDINE GLUCONATE 15 MILLILITER(S): 213 SOLUTION TOPICAL at 05:52

## 2019-02-11 RX ADMIN — Medication 81 MILLIGRAM(S): at 19:31

## 2019-02-11 RX ADMIN — INSULIN HUMAN 2 UNIT(S)/HR: 100 INJECTION, SOLUTION SUBCUTANEOUS at 18:54

## 2019-02-11 RX ADMIN — DEXMEDETOMIDINE HYDROCHLORIDE IN 0.9% SODIUM CHLORIDE 7.91 MICROGRAM(S)/KG/HR: 4 INJECTION INTRAVENOUS at 16:45

## 2019-02-11 RX ADMIN — Medication 400 MILLIGRAM(S): at 17:58

## 2019-02-11 RX ADMIN — Medication 16 MICROGRAM(S)/KG/MIN: at 18:54

## 2019-02-11 RX ADMIN — SODIUM CHLORIDE 3000 MILLILITER(S): 9 INJECTION, SOLUTION INTRAVENOUS at 17:16

## 2019-02-11 RX ADMIN — HYDROMORPHONE HYDROCHLORIDE 1 MILLIGRAM(S): 2 INJECTION INTRAMUSCULAR; INTRAVENOUS; SUBCUTANEOUS at 23:37

## 2019-02-11 RX ADMIN — HYDROMORPHONE HYDROCHLORIDE 0.5 MILLIGRAM(S): 2 INJECTION INTRAMUSCULAR; INTRAVENOUS; SUBCUTANEOUS at 16:10

## 2019-02-11 NOTE — AIRWAY REMOVAL NOTE  ADULT & PEDS - ARTIFICAL AIRWAY REMOVAL COMMENTS
Written order for extubation verified. The patient was identified by full name and birth date compared to the identification band. Present during the procedure was satish GLEASON

## 2019-02-11 NOTE — PROGRESS NOTE ADULT - SUBJECTIVE AND OBJECTIVE BOX
CASTILLO LOZA   MRN#: [id number]     The patient is a 58y Male who was seen, evaluated, & examined with the CTICU staff on rounds and later in the day with a multidisciplinary care plan formulated & implemented.  All available clinical, laboratory, radiographic, pharmacologic, and electrocardiographic data were reviewed & analyzed.      The patient was in the CTICU in critical condition secondary to acute respiratory failure-persistent cardiopulmonary dysfunction, hemodynamically significant hypovolemia-shock, hyperlactatemia-acidosis, & stress hyperglycemia.      Respiratory failure required full ventilatory support, the following of ABG’s with A-line monitoring, and continuous pulse oximetry monitoring for support & to evaluate for & prevent further decompensation secondary to persistent cardiopulmonary dysfunction.     Invasive hemodynamic monitoring with an A-line was required for the following of continuous MAP/BP monitoring to ensure adequate cardiovascular support and to evaluate for & help prevent decompensation while receiving intermittent volume expansion, an IV Vasopressin drip, and an IV Levophed drip secondary to hemodynamically significant hypovolemia-shock and  hyperlactatemia-acidosis S/P C3L    Metabolic stability, stress hyperglycemia, & infection prophylaxis required an IV regular Insulin drip & the following of serial glucose levels to help achieve & maintain euglycemia.      Patient required critical care management and I provided 30 minutes of non-continuous care to the patient.  Discussed at length with the CTICU staff and helped coordinate care.

## 2019-02-12 LAB
ALBUMIN SERPL ELPH-MCNC: 3.9 G/DL — SIGNIFICANT CHANGE UP (ref 3.3–5)
ALP SERPL-CCNC: 37 U/L — LOW (ref 40–120)
ALT FLD-CCNC: 35 U/L — SIGNIFICANT CHANGE UP (ref 10–45)
ANION GAP SERPL CALC-SCNC: 14 MMOL/L — SIGNIFICANT CHANGE UP (ref 5–17)
APTT BLD: 27.6 SEC — SIGNIFICANT CHANGE UP (ref 27.5–36.3)
AST SERPL-CCNC: 39 U/L — SIGNIFICANT CHANGE UP (ref 10–40)
BASOPHILS # BLD AUTO: 0 K/UL — SIGNIFICANT CHANGE UP (ref 0–0.2)
BASOPHILS NFR BLD AUTO: 0 % — SIGNIFICANT CHANGE UP (ref 0–2)
BILIRUB SERPL-MCNC: 0.3 MG/DL — SIGNIFICANT CHANGE UP (ref 0.2–1.2)
BUN SERPL-MCNC: 15 MG/DL — SIGNIFICANT CHANGE UP (ref 7–23)
CALCIUM SERPL-MCNC: 8 MG/DL — LOW (ref 8.4–10.5)
CHLORIDE BLDV-SCNC: SIGNIFICANT CHANGE UP MMOL/L (ref 96–108)
CHLORIDE SERPL-SCNC: 105 MMOL/L — SIGNIFICANT CHANGE UP (ref 96–108)
CO2 SERPL-SCNC: 18 MMOL/L — LOW (ref 22–31)
CREAT SERPL-MCNC: 0.87 MG/DL — SIGNIFICANT CHANGE UP (ref 0.5–1.3)
EOSINOPHIL # BLD AUTO: 0 K/UL — SIGNIFICANT CHANGE UP (ref 0–0.5)
EOSINOPHIL NFR BLD AUTO: 0.2 % — SIGNIFICANT CHANGE UP (ref 0–6)
GAS PNL BLDA: SIGNIFICANT CHANGE UP
GLUCOSE BLDC GLUCOMTR-MCNC: 108 MG/DL — HIGH (ref 70–99)
GLUCOSE BLDC GLUCOMTR-MCNC: 116 MG/DL — HIGH (ref 70–99)
GLUCOSE BLDC GLUCOMTR-MCNC: 118 MG/DL — HIGH (ref 70–99)
GLUCOSE BLDC GLUCOMTR-MCNC: 119 MG/DL — HIGH (ref 70–99)
GLUCOSE BLDC GLUCOMTR-MCNC: 120 MG/DL — HIGH (ref 70–99)
GLUCOSE BLDC GLUCOMTR-MCNC: 123 MG/DL — HIGH (ref 70–99)
GLUCOSE BLDC GLUCOMTR-MCNC: 123 MG/DL — HIGH (ref 70–99)
GLUCOSE BLDC GLUCOMTR-MCNC: 129 MG/DL — HIGH (ref 70–99)
GLUCOSE BLDC GLUCOMTR-MCNC: 139 MG/DL — HIGH (ref 70–99)
GLUCOSE BLDC GLUCOMTR-MCNC: 149 MG/DL — HIGH (ref 70–99)
GLUCOSE BLDC GLUCOMTR-MCNC: 150 MG/DL — HIGH (ref 70–99)
GLUCOSE BLDC GLUCOMTR-MCNC: 152 MG/DL — HIGH (ref 70–99)
GLUCOSE BLDC GLUCOMTR-MCNC: 155 MG/DL — HIGH (ref 70–99)
GLUCOSE SERPL-MCNC: 131 MG/DL — HIGH (ref 70–99)
HCT VFR BLD CALC: 28.4 % — LOW (ref 39–50)
HGB BLD-MCNC: 10 G/DL — LOW (ref 13–17)
INR BLD: 1.16 RATIO — SIGNIFICANT CHANGE UP (ref 0.88–1.16)
LYMPHOCYTES # BLD AUTO: 0.8 K/UL — LOW (ref 1–3.3)
LYMPHOCYTES # BLD AUTO: 5.8 % — LOW (ref 13–44)
MCHC RBC-ENTMCNC: 31.2 PG — SIGNIFICANT CHANGE UP (ref 27–34)
MCHC RBC-ENTMCNC: 35.4 GM/DL — SIGNIFICANT CHANGE UP (ref 32–36)
MCV RBC AUTO: 88.2 FL — SIGNIFICANT CHANGE UP (ref 80–100)
MONOCYTES # BLD AUTO: 0.9 K/UL — SIGNIFICANT CHANGE UP (ref 0–0.9)
MONOCYTES NFR BLD AUTO: 6.4 % — SIGNIFICANT CHANGE UP (ref 2–14)
NEUTROPHILS # BLD AUTO: 11.6 K/UL — HIGH (ref 1.8–7.4)
NEUTROPHILS NFR BLD AUTO: 87.5 % — HIGH (ref 43–77)
PLATELET # BLD AUTO: 276 K/UL — SIGNIFICANT CHANGE UP (ref 150–400)
POTASSIUM SERPL-MCNC: 4.2 MMOL/L — SIGNIFICANT CHANGE UP (ref 3.5–5.3)
POTASSIUM SERPL-SCNC: 4.2 MMOL/L — SIGNIFICANT CHANGE UP (ref 3.5–5.3)
PROT SERPL-MCNC: 5.9 G/DL — LOW (ref 6–8.3)
PROTHROM AB SERPL-ACNC: 13.3 SEC — HIGH (ref 10–12.9)
RBC # BLD: 3.22 M/UL — LOW (ref 4.2–5.8)
RBC # FLD: 12.9 % — SIGNIFICANT CHANGE UP (ref 10.3–14.5)
SODIUM SERPL-SCNC: 137 MMOL/L — SIGNIFICANT CHANGE UP (ref 135–145)
WBC # BLD: 13.3 K/UL — HIGH (ref 3.8–10.5)
WBC # FLD AUTO: 13.3 K/UL — HIGH (ref 3.8–10.5)

## 2019-02-12 PROCEDURE — 71045 X-RAY EXAM CHEST 1 VIEW: CPT | Mod: 26

## 2019-02-12 PROCEDURE — 93010 ELECTROCARDIOGRAM REPORT: CPT

## 2019-02-12 PROCEDURE — 99291 CRITICAL CARE FIRST HOUR: CPT

## 2019-02-12 RX ORDER — SODIUM CHLORIDE 9 MG/ML
3 INJECTION INTRAMUSCULAR; INTRAVENOUS; SUBCUTANEOUS EVERY 8 HOURS
Qty: 0 | Refills: 0 | Status: DISCONTINUED | OUTPATIENT
Start: 2019-02-12 | End: 2019-02-16

## 2019-02-12 RX ORDER — PANTOPRAZOLE SODIUM 20 MG/1
40 TABLET, DELAYED RELEASE ORAL
Qty: 0 | Refills: 0 | Status: DISCONTINUED | OUTPATIENT
Start: 2019-02-12 | End: 2019-02-16

## 2019-02-12 RX ORDER — HYDROMORPHONE HYDROCHLORIDE 2 MG/ML
0.5 INJECTION INTRAMUSCULAR; INTRAVENOUS; SUBCUTANEOUS ONCE
Qty: 0 | Refills: 0 | Status: DISCONTINUED | OUTPATIENT
Start: 2019-02-12 | End: 2019-02-12

## 2019-02-12 RX ORDER — METOPROLOL TARTRATE 50 MG
25 TABLET ORAL EVERY 12 HOURS
Qty: 0 | Refills: 0 | Status: DISCONTINUED | OUTPATIENT
Start: 2019-02-12 | End: 2019-02-12

## 2019-02-12 RX ORDER — HYDROMORPHONE HYDROCHLORIDE 2 MG/ML
1 INJECTION INTRAMUSCULAR; INTRAVENOUS; SUBCUTANEOUS ONCE
Qty: 0 | Refills: 0 | Status: DISCONTINUED | OUTPATIENT
Start: 2019-02-12 | End: 2019-02-12

## 2019-02-12 RX ORDER — INSULIN LISPRO 100/ML
VIAL (ML) SUBCUTANEOUS
Qty: 0 | Refills: 0 | Status: DISCONTINUED | OUTPATIENT
Start: 2019-02-12 | End: 2019-02-16

## 2019-02-12 RX ORDER — HYDROMORPHONE HYDROCHLORIDE 2 MG/ML
0.5 INJECTION INTRAMUSCULAR; INTRAVENOUS; SUBCUTANEOUS EVERY 4 HOURS
Qty: 0 | Refills: 0 | Status: DISCONTINUED | OUTPATIENT
Start: 2019-02-12 | End: 2019-02-15

## 2019-02-12 RX ORDER — ENOXAPARIN SODIUM 100 MG/ML
40 INJECTION SUBCUTANEOUS DAILY
Qty: 0 | Refills: 0 | Status: DISCONTINUED | OUTPATIENT
Start: 2019-02-12 | End: 2019-02-16

## 2019-02-12 RX ORDER — DOCUSATE SODIUM 100 MG
100 CAPSULE ORAL THREE TIMES A DAY
Qty: 0 | Refills: 0 | Status: DISCONTINUED | OUTPATIENT
Start: 2019-02-12 | End: 2019-02-16

## 2019-02-12 RX ORDER — HUMAN INSULIN 100 [IU]/ML
6 INJECTION, SUSPENSION SUBCUTANEOUS ONCE
Qty: 0 | Refills: 0 | Status: COMPLETED | OUTPATIENT
Start: 2019-02-12 | End: 2019-02-12

## 2019-02-12 RX ORDER — METOPROLOL TARTRATE 50 MG
5 TABLET ORAL ONCE
Qty: 0 | Refills: 0 | Status: DISCONTINUED | OUTPATIENT
Start: 2019-02-12 | End: 2019-02-12

## 2019-02-12 RX ORDER — METOPROLOL TARTRATE 50 MG
25 TABLET ORAL EVERY 8 HOURS
Qty: 0 | Refills: 0 | Status: DISCONTINUED | OUTPATIENT
Start: 2019-02-12 | End: 2019-02-13

## 2019-02-12 RX ORDER — INSULIN LISPRO 100/ML
VIAL (ML) SUBCUTANEOUS AT BEDTIME
Qty: 0 | Refills: 0 | Status: DISCONTINUED | OUTPATIENT
Start: 2019-02-12 | End: 2019-02-16

## 2019-02-12 RX ORDER — GABAPENTIN 400 MG/1
100 CAPSULE ORAL EVERY 8 HOURS
Qty: 0 | Refills: 0 | Status: DISCONTINUED | OUTPATIENT
Start: 2019-02-12 | End: 2019-02-16

## 2019-02-12 RX ORDER — ACETAMINOPHEN 500 MG
1000 TABLET ORAL ONCE
Qty: 0 | Refills: 0 | Status: COMPLETED | OUTPATIENT
Start: 2019-02-12 | End: 2019-02-12

## 2019-02-12 RX ORDER — ATORVASTATIN CALCIUM 80 MG/1
40 TABLET, FILM COATED ORAL AT BEDTIME
Qty: 0 | Refills: 0 | Status: DISCONTINUED | OUTPATIENT
Start: 2019-02-12 | End: 2019-02-16

## 2019-02-12 RX ADMIN — Medication 100 MILLIGRAM(S): at 16:35

## 2019-02-12 RX ADMIN — HYDROMORPHONE HYDROCHLORIDE 1 MILLIGRAM(S): 2 INJECTION INTRAMUSCULAR; INTRAVENOUS; SUBCUTANEOUS at 04:23

## 2019-02-12 RX ADMIN — OXYCODONE HYDROCHLORIDE 5 MILLIGRAM(S): 5 TABLET ORAL at 21:50

## 2019-02-12 RX ADMIN — SODIUM CHLORIDE 3 MILLILITER(S): 9 INJECTION INTRAMUSCULAR; INTRAVENOUS; SUBCUTANEOUS at 21:45

## 2019-02-12 RX ADMIN — ATORVASTATIN CALCIUM 40 MILLIGRAM(S): 80 TABLET, FILM COATED ORAL at 21:50

## 2019-02-12 RX ADMIN — Medication 1000 MILLIGRAM(S): at 10:45

## 2019-02-12 RX ADMIN — HYDROMORPHONE HYDROCHLORIDE 0.5 MILLIGRAM(S): 2 INJECTION INTRAMUSCULAR; INTRAVENOUS; SUBCUTANEOUS at 07:03

## 2019-02-12 RX ADMIN — SODIUM CHLORIDE 3 MILLILITER(S): 9 INJECTION INTRAMUSCULAR; INTRAVENOUS; SUBCUTANEOUS at 14:03

## 2019-02-12 RX ADMIN — Medication 100 MILLIGRAM(S): at 06:26

## 2019-02-12 RX ADMIN — Medication 400 MILLIGRAM(S): at 10:15

## 2019-02-12 RX ADMIN — HYDROMORPHONE HYDROCHLORIDE 1 MILLIGRAM(S): 2 INJECTION INTRAMUSCULAR; INTRAVENOUS; SUBCUTANEOUS at 09:30

## 2019-02-12 RX ADMIN — INSULIN HUMAN 2 UNIT(S)/HR: 100 INJECTION, SOLUTION SUBCUTANEOUS at 08:15

## 2019-02-12 RX ADMIN — HYDROMORPHONE HYDROCHLORIDE 1 MILLIGRAM(S): 2 INJECTION INTRAMUSCULAR; INTRAVENOUS; SUBCUTANEOUS at 09:45

## 2019-02-12 RX ADMIN — OXYCODONE HYDROCHLORIDE 5 MILLIGRAM(S): 5 TABLET ORAL at 16:40

## 2019-02-12 RX ADMIN — HYDROMORPHONE HYDROCHLORIDE 0.5 MILLIGRAM(S): 2 INJECTION INTRAMUSCULAR; INTRAVENOUS; SUBCUTANEOUS at 18:05

## 2019-02-12 RX ADMIN — PANTOPRAZOLE SODIUM 40 MILLIGRAM(S): 20 TABLET, DELAYED RELEASE ORAL at 08:13

## 2019-02-12 RX ADMIN — Medication 100 MILLIGRAM(S): at 13:38

## 2019-02-12 RX ADMIN — GABAPENTIN 100 MILLIGRAM(S): 400 CAPSULE ORAL at 13:38

## 2019-02-12 RX ADMIN — HYDROMORPHONE HYDROCHLORIDE 0.5 MILLIGRAM(S): 2 INJECTION INTRAMUSCULAR; INTRAVENOUS; SUBCUTANEOUS at 13:40

## 2019-02-12 RX ADMIN — Medication 1000 MILLIGRAM(S): at 22:35

## 2019-02-12 RX ADMIN — Medication 25 MILLIGRAM(S): at 13:38

## 2019-02-12 RX ADMIN — OXYCODONE HYDROCHLORIDE 5 MILLIGRAM(S): 5 TABLET ORAL at 22:20

## 2019-02-12 RX ADMIN — Medication 25 MILLIGRAM(S): at 01:59

## 2019-02-12 RX ADMIN — HYDROMORPHONE HYDROCHLORIDE 1 MILLIGRAM(S): 2 INJECTION INTRAMUSCULAR; INTRAVENOUS; SUBCUTANEOUS at 04:38

## 2019-02-12 RX ADMIN — OXYCODONE HYDROCHLORIDE 5 MILLIGRAM(S): 5 TABLET ORAL at 16:11

## 2019-02-12 RX ADMIN — Medication 100 MILLIGRAM(S): at 21:50

## 2019-02-12 RX ADMIN — ENOXAPARIN SODIUM 40 MILLIGRAM(S): 100 INJECTION SUBCUTANEOUS at 11:51

## 2019-02-12 RX ADMIN — Medication 25 MILLIGRAM(S): at 21:50

## 2019-02-12 RX ADMIN — Medication 100 MILLIGRAM(S): at 23:47

## 2019-02-12 RX ADMIN — POLYETHYLENE GLYCOL 3350 17 GRAM(S): 17 POWDER, FOR SOLUTION ORAL at 16:36

## 2019-02-12 RX ADMIN — HUMAN INSULIN 6 UNIT(S): 100 INJECTION, SUSPENSION SUBCUTANEOUS at 11:59

## 2019-02-12 RX ADMIN — HYDROMORPHONE HYDROCHLORIDE 0.5 MILLIGRAM(S): 2 INJECTION INTRAMUSCULAR; INTRAVENOUS; SUBCUTANEOUS at 13:55

## 2019-02-12 RX ADMIN — Medication 81 MILLIGRAM(S): at 11:51

## 2019-02-12 RX ADMIN — Medication 400 MILLIGRAM(S): at 22:10

## 2019-02-12 RX ADMIN — Medication 100 MILLIGRAM(S): at 08:13

## 2019-02-12 RX ADMIN — HYDROMORPHONE HYDROCHLORIDE 0.5 MILLIGRAM(S): 2 INJECTION INTRAMUSCULAR; INTRAVENOUS; SUBCUTANEOUS at 18:39

## 2019-02-12 RX ADMIN — GABAPENTIN 100 MILLIGRAM(S): 400 CAPSULE ORAL at 21:50

## 2019-02-12 RX ADMIN — Medication 2: at 16:28

## 2019-02-12 RX ADMIN — HYDROMORPHONE HYDROCHLORIDE 0.5 MILLIGRAM(S): 2 INJECTION INTRAMUSCULAR; INTRAVENOUS; SUBCUTANEOUS at 07:18

## 2019-02-12 NOTE — PROGRESS NOTE ADULT - SUBJECTIVE AND OBJECTIVE BOX
CASTILLO LOZA   MRN#: [id number]     The patient is a 58y Male who was seen, evaluated, & examined with the CTICU staff post-operatively with a multidisciplinary care plan formulated & implemented.  All available clinical, laboratory, radiographic, pharmacologic, and electrocardiographic data were reviewed & analyzed.      The patient was in the CTICU in critical condition secondary to:     acute hypoxic respiratory failure-persistent cardiopulmonary dysfunction  malignant hypertension      For support and evaluation & prevention of further decompensation secondary to persistent cardiopulmonary dysfunction and cardiogenic shock-cardiovascular dysfunction, respiratory status / failure required:     supplemental oxygen with high flow nasal cannula  continuous pulse oximetry monitoring  following ABGs with A-line monitoring    Invasive hemodynamic monitoring with     an A-line was required for continuous MAP/BP monitoring     to ensure adequate cardiovascular support and to evaluate for & help prevent decompensation while receiving     intermittent volume expansion  IV Cardene infusion    secondary to     malignant hypertension    In addition:  Extubated, off pressors, on Cardene infusion for blood pressure control; will add beta blocker and wean Cardene  Requiring high flow nasal cannula 80%, likely due to splinting from pain but also ? shunting from Cardene; on Dilaudid for pain control but with likely high opioid tolerance    The patient required critical care management and I provided 30 minutes of non-continuous care to the patient in addition to  discussing the patient and plan at length with the CTICU staff and helping coordinate care.

## 2019-02-12 NOTE — PROGRESS NOTE ADULT - SUBJECTIVE AND OBJECTIVE BOX
VITAL SIGNS    Telemetry:      Vital Signs Last 24 Hrs  T(C): 36.5 (19 @ 14:30), Max: 37.2 (19 @ 20:00)  T(F): 97.7 (19 @ 14:30), Max: 99 (19 @ 20:00)  HR: 95 (19 @ 14:30) (76 - 103)  BP: 118/73 (19 @ 14:30) (118/73 - 123/72)  RR: 18 (19 @ 14:30) (4 - 35)  SpO2: 93% (19 @ 14:30) (89% - 100%)                   Daily     Daily Weight in k.2 (2019 08:47)      Bilirubin Total, Serum: 0.3 mg/dL ( @ 00:28)    CAPILLARY BLOOD GLUCOSE  139 (2019 14:00)  108 (2019 13:00)  118 (2019 12:00)  152 (2019 11:00)  144 (2019 10:00)  150 (2019 09:00)  117 (2019 08:00)  129 (2019 07:00)  119 (2019 06:00)  114 (2019 05:00)  116 (2019 04:00)  123 (2019 03:00)  120 (2019 02:00)  123 (2019 01:00)  127 (2019 00:00)  131 (2019 18:30)  127 (2019 17:15)      POCT Blood Glucose.: 155 mg/dL (2019 16:19)  POCT Blood Glucose.: 139 mg/dL (2019 13:41)  POCT Blood Glucose.: 108 mg/dL (2019 13:01)  POCT Blood Glucose.: 118 mg/dL (2019 12:02)  POCT Blood Glucose.: 152 mg/dL (2019 10:56)  POCT Blood Glucose.: 150 mg/dL (2019 09:16)  POCT Blood Glucose.: 129 mg/dL (2019 06:47)  POCT Blood Glucose.: 119 mg/dL (2019 06:05)  POCT Blood Glucose.: 116 mg/dL (2019 04:07)  POCT Blood Glucose.: 123 mg/dL (2019 03:02)  POCT Blood Glucose.: 120 mg/dL (2019 02:02)  POCT Blood Glucose.: 123 mg/dL (2019 00:56)  POCT Blood Glucose.: 119 mg/dL (2019 21:14)          Drains:     MS         [  ] Drainage:                 L Pleural  [  ]  Drainage:                R Pleural  [  ]  Drainage:    Pacing Wires        [  ]   Settings:                                                     PHYSICAL EXAM    Neurology: alert and oriented x 3, moves all extremities with no defecits  CV :  RRR  Sternal Wound :  CDI , Stable  Lungs:   CTA B/L  Abdomen: soft, nontender, nondistended, positive bowel sounds  Extremities:

## 2019-02-12 NOTE — PROGRESS NOTE ADULT - ASSESSMENT
58M with PMH of HTN, HLD, INOCENCIA (Cr 1.8, GFR 40), current smoker and heroin abuse who was found down at his workplace yesterday after overdosing on heroin at work. He received Narcan and was brought to [facility]. At OSH, EKG concerning for IWSTEMI. Troponin T 0.354. CT head was negative and he was given ASA, Brilinta 180 x1, Plavix 600 mg x1 (unclear why both were given) and started on a heparin gtt. He was transferred to [facility] ED for cath evaluation.     He reports having intermittent chest pain or shortness of breath 1-2 weeks PTA. He currently does have R jaw tingling. His exertional capacity is limited by foot drop for which he wears a brace. He had a stress test about 5 years that he thinks was normal. He smokes 1 pack per day. Occasionally snorts heroin, denies other drug use. His wife does not know about his drug use. Started using heroin last year- twice a week, minimal amount as per patient (" 2 bags")  2/7    VSS    some anxiety,    xanax prn,  Hep Gtt,  LE splint for prior foot drop  2/8 VSS; psych consulted for hx of heroine use and anxiety  preop workup in progress; p2y12 51 today - repeat on sunday; pt currently on heparin gttp   neg chest pain/ sob  [facility] 2/11 with Dr. Christine -2nd case  pt with trepidations re: OHS - emotional support given - pre-op teaching provided  2/12

## 2019-02-12 NOTE — DIETITIAN INITIAL EVALUATION ADULT. - PHYSICAL APPEARANCE
overweight/Pt with no apparent signs or symptoms of muscle mass/body fat depletion, appears overweight./well nourished

## 2019-02-12 NOTE — PHYSICAL THERAPY INITIAL EVALUATION ADULT - PERTINENT HX OF CURRENT PROBLEM, REHAB EVAL
57 yo M h/o opiod abuse and HTN, transfer from OSH for opioid OD and CP x 2 wks (worsened over last 2 days) w/ concerning EKG findings. Pt was found down at his workplace after overdosing on heroin at work. He received Narcan, brought to [facility]. When he pushes down on his chest the pain always goes away. Denies SOB, radiation, nausea or vomiting at any time. Now s/p CABGx3 (LIMA to LAD. SVG to OM-1. SVG-rPDA) on 2/11. XRay Chest 2/12: The heart is enlarged. The lungs appear to be clear

## 2019-02-12 NOTE — DIETITIAN INITIAL EVALUATION ADULT. - ENERGY NEEDS
Pt seen for CTU length of stay. Pt with PMH: HTN, HLD, INOCENCIA, current smoker, heroin abuse. Pt admitted with STEMI, 3-vessel CAD. Pt s/p CABG, POD#1.    Ht: 5'10"  Wt: (dosing): 210.1 lbs  BMI: 30.1kg/m2  IBW: 166 lbs +/- 10%  % IBW: 115% (UL)   Edema: 1+ generalized   Skin: Surgical incision to midsternal, right leg

## 2019-02-12 NOTE — DIETITIAN INITIAL EVALUATION ADULT. - PERTINENT LABORATORY DATA
(2/12): Hgb 10.0, Hct 28.4, PCOT Blood Glucose: 129, 119, 116, 123, 120, 123, Glu 131, Ca 8.0,  total protein 5.9; Blood Gas: Glu 117, Na 132, Hgb 10.3, Hct 32  (2/6): HgbA1c 5.8

## 2019-02-12 NOTE — PHYSICAL THERAPY INITIAL EVALUATION ADULT - BALANCE TRAINING, PT EVAL
GOAL: Pt will increase static/ dynamic standing balance to Good- to improve safety with functional activities in 4 weeks.

## 2019-02-12 NOTE — DIETITIAN INITIAL EVALUATION ADULT. - ADHERENCE
poor/Pt denies following therapeutic diet PTA and appears to typically consume a high fat/high Na diet. Admits he typically consumed a diet consisting of meat, potatoes, fast foods 3x/week ([employer]). Pt denies following therapeutic diet PTA and appears to typically consume a high fat/high Na diet. Admits he typically consumed a diet consisting of meat, potatoes, fast foods 3x/week ([employer], [employer]), "convenience foods". Denies cooking own meals, as him and his wife both work./poor
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
## 2019-02-12 NOTE — DIETITIAN INITIAL EVALUATION ADULT. - NS AS NUTRI INTERV MEALS SNACK
General/healthful diet/1. Recommend change diet to low sodium at this time. Pt appears with glycemic control at this time. 2. Encourage PO intake, specifically of high protein sources on tray. 3. Monitor wts trends, labs, skin integrity and hydration status.

## 2019-02-12 NOTE — DIETITIAN INITIAL EVALUATION ADULT. - SIGNS/SYMPTOMS
POD#1 s/p CABG per diet recall, diet likely high in sodium; pt admits to not following therapeutic diet PTA

## 2019-02-12 NOTE — DIETITIAN INITIAL EVALUATION ADULT. - OTHER INFO
Pt admits UBW: 212-215lbs. Denies hx of wt changes. Dosing wt: 210.1lbs. Appears stable. Pt with no food allergies, noted to take centrum silver, vitamin D, garlic pills PTA. Pt with no N/V, hx of constipation of diarrhea. Pt unsure of last BM; per nursing flow sheet, last BM ? 2/7. Will monitor. Currently receiving colace as per review of medication regimen. Pt denies intolerance to chewing/swallowing, noted with own teeth. In-house, pt reports good appetite. Observed 50% of meal tray consumed at time of RD visit. Encouraged consumption of protein source (eggs on tray) 2/2 surgical wound.

## 2019-02-12 NOTE — DIETITIAN INITIAL EVALUATION ADULT. - NS AS NUTRI INTERV ED CONTENT3
Nutrition relationship to health/disease/Reviewed CHF diet education. Discussed Na restriction, foods high in Na to avoid, reading food labels, tips for limiting Na in your diet. Reviewed daily weights, Wt gain parameters to contact MD. Discussed Na intake in relation to fluid retention, edema and Wt gain. Pt verbalized understanding and accepted Heart Failure Nutrition Therapy Handout.  RD remains available for diet education review.

## 2019-02-13 DIAGNOSIS — Z95.1 PRESENCE OF AORTOCORONARY BYPASS GRAFT: ICD-10-CM

## 2019-02-13 DIAGNOSIS — M21.371 FOOT DROP, RIGHT FOOT: ICD-10-CM

## 2019-02-13 LAB
ANION GAP SERPL CALC-SCNC: 12 MMOL/L — SIGNIFICANT CHANGE UP (ref 5–17)
BUN SERPL-MCNC: 18 MG/DL — SIGNIFICANT CHANGE UP (ref 7–23)
CALCIUM SERPL-MCNC: 8.6 MG/DL — SIGNIFICANT CHANGE UP (ref 8.4–10.5)
CHLORIDE SERPL-SCNC: 104 MMOL/L — SIGNIFICANT CHANGE UP (ref 96–108)
CO2 SERPL-SCNC: 23 MMOL/L — SIGNIFICANT CHANGE UP (ref 22–31)
CREAT SERPL-MCNC: 1.08 MG/DL — SIGNIFICANT CHANGE UP (ref 0.5–1.3)
GLUCOSE BLDC GLUCOMTR-MCNC: 114 MG/DL — HIGH (ref 70–99)
GLUCOSE BLDC GLUCOMTR-MCNC: 126 MG/DL — HIGH (ref 70–99)
GLUCOSE BLDC GLUCOMTR-MCNC: 126 MG/DL — HIGH (ref 70–99)
GLUCOSE BLDC GLUCOMTR-MCNC: 132 MG/DL — HIGH (ref 70–99)
GLUCOSE SERPL-MCNC: 132 MG/DL — HIGH (ref 70–99)
HCT VFR BLD CALC: 29 % — LOW (ref 39–50)
HGB BLD-MCNC: 10.3 G/DL — LOW (ref 13–17)
MCHC RBC-ENTMCNC: 31.5 PG — SIGNIFICANT CHANGE UP (ref 27–34)
MCHC RBC-ENTMCNC: 35.5 GM/DL — SIGNIFICANT CHANGE UP (ref 32–36)
MCV RBC AUTO: 88.7 FL — SIGNIFICANT CHANGE UP (ref 80–100)
PLATELET # BLD AUTO: 364 K/UL — SIGNIFICANT CHANGE UP (ref 150–400)
POTASSIUM SERPL-MCNC: 4.3 MMOL/L — SIGNIFICANT CHANGE UP (ref 3.5–5.3)
POTASSIUM SERPL-SCNC: 4.3 MMOL/L — SIGNIFICANT CHANGE UP (ref 3.5–5.3)
RBC # BLD: 3.27 M/UL — LOW (ref 4.2–5.8)
RBC # FLD: 13 % — SIGNIFICANT CHANGE UP (ref 10.3–14.5)
SODIUM SERPL-SCNC: 139 MMOL/L — SIGNIFICANT CHANGE UP (ref 135–145)
WBC # BLD: 14 K/UL — HIGH (ref 3.8–10.5)
WBC # FLD AUTO: 14 K/UL — HIGH (ref 3.8–10.5)

## 2019-02-13 PROCEDURE — 71045 X-RAY EXAM CHEST 1 VIEW: CPT | Mod: 26,76

## 2019-02-13 RX ORDER — ALPRAZOLAM 0.25 MG
0.25 TABLET ORAL AT BEDTIME
Qty: 0 | Refills: 0 | Status: DISCONTINUED | OUTPATIENT
Start: 2019-02-13 | End: 2019-02-16

## 2019-02-13 RX ORDER — OXYCODONE HYDROCHLORIDE 5 MG/1
5 TABLET ORAL ONCE
Qty: 0 | Refills: 0 | Status: DISCONTINUED | OUTPATIENT
Start: 2019-02-13 | End: 2019-02-13

## 2019-02-13 RX ORDER — ACETAMINOPHEN 500 MG
1000 TABLET ORAL ONCE
Qty: 0 | Refills: 0 | Status: COMPLETED | OUTPATIENT
Start: 2019-02-13 | End: 2019-02-13

## 2019-02-13 RX ORDER — METOPROLOL TARTRATE 50 MG
50 TABLET ORAL EVERY 12 HOURS
Qty: 0 | Refills: 0 | Status: DISCONTINUED | OUTPATIENT
Start: 2019-02-13 | End: 2019-02-14

## 2019-02-13 RX ADMIN — HYDROMORPHONE HYDROCHLORIDE 0.5 MILLIGRAM(S): 2 INJECTION INTRAMUSCULAR; INTRAVENOUS; SUBCUTANEOUS at 07:16

## 2019-02-13 RX ADMIN — POLYETHYLENE GLYCOL 3350 17 GRAM(S): 17 POWDER, FOR SOLUTION ORAL at 11:26

## 2019-02-13 RX ADMIN — Medication 50 MILLIGRAM(S): at 17:53

## 2019-02-13 RX ADMIN — OXYCODONE HYDROCHLORIDE 5 MILLIGRAM(S): 5 TABLET ORAL at 10:43

## 2019-02-13 RX ADMIN — Medication 100 MILLIGRAM(S): at 13:36

## 2019-02-13 RX ADMIN — HYDROMORPHONE HYDROCHLORIDE 0.5 MILLIGRAM(S): 2 INJECTION INTRAMUSCULAR; INTRAVENOUS; SUBCUTANEOUS at 03:10

## 2019-02-13 RX ADMIN — SODIUM CHLORIDE 3 MILLILITER(S): 9 INJECTION INTRAMUSCULAR; INTRAVENOUS; SUBCUTANEOUS at 22:05

## 2019-02-13 RX ADMIN — HYDROMORPHONE HYDROCHLORIDE 0.5 MILLIGRAM(S): 2 INJECTION INTRAMUSCULAR; INTRAVENOUS; SUBCUTANEOUS at 16:10

## 2019-02-13 RX ADMIN — SODIUM CHLORIDE 3 MILLILITER(S): 9 INJECTION INTRAMUSCULAR; INTRAVENOUS; SUBCUTANEOUS at 13:38

## 2019-02-13 RX ADMIN — OXYCODONE HYDROCHLORIDE 5 MILLIGRAM(S): 5 TABLET ORAL at 20:10

## 2019-02-13 RX ADMIN — Medication 1000 MILLIGRAM(S): at 12:50

## 2019-02-13 RX ADMIN — GABAPENTIN 100 MILLIGRAM(S): 400 CAPSULE ORAL at 05:05

## 2019-02-13 RX ADMIN — ENOXAPARIN SODIUM 40 MILLIGRAM(S): 100 INJECTION SUBCUTANEOUS at 11:25

## 2019-02-13 RX ADMIN — PANTOPRAZOLE SODIUM 40 MILLIGRAM(S): 20 TABLET, DELAYED RELEASE ORAL at 05:05

## 2019-02-13 RX ADMIN — GABAPENTIN 100 MILLIGRAM(S): 400 CAPSULE ORAL at 22:06

## 2019-02-13 RX ADMIN — OXYCODONE HYDROCHLORIDE 5 MILLIGRAM(S): 5 TABLET ORAL at 05:04

## 2019-02-13 RX ADMIN — Medication 81 MILLIGRAM(S): at 11:25

## 2019-02-13 RX ADMIN — SODIUM CHLORIDE 3 MILLILITER(S): 9 INJECTION INTRAMUSCULAR; INTRAVENOUS; SUBCUTANEOUS at 05:08

## 2019-02-13 RX ADMIN — OXYCODONE HYDROCHLORIDE 5 MILLIGRAM(S): 5 TABLET ORAL at 05:35

## 2019-02-13 RX ADMIN — GABAPENTIN 100 MILLIGRAM(S): 400 CAPSULE ORAL at 13:36

## 2019-02-13 RX ADMIN — Medication 400 MILLIGRAM(S): at 12:19

## 2019-02-13 RX ADMIN — Medication 0.25 MILLIGRAM(S): at 22:24

## 2019-02-13 RX ADMIN — HYDROMORPHONE HYDROCHLORIDE 0.5 MILLIGRAM(S): 2 INJECTION INTRAMUSCULAR; INTRAVENOUS; SUBCUTANEOUS at 02:54

## 2019-02-13 RX ADMIN — Medication 100 MILLIGRAM(S): at 05:05

## 2019-02-13 RX ADMIN — Medication 25 MILLIGRAM(S): at 05:05

## 2019-02-13 RX ADMIN — Medication 100 MILLIGRAM(S): at 22:06

## 2019-02-13 RX ADMIN — HYDROMORPHONE HYDROCHLORIDE 0.5 MILLIGRAM(S): 2 INJECTION INTRAMUSCULAR; INTRAVENOUS; SUBCUTANEOUS at 16:25

## 2019-02-13 RX ADMIN — OXYCODONE HYDROCHLORIDE 5 MILLIGRAM(S): 5 TABLET ORAL at 11:15

## 2019-02-13 RX ADMIN — HYDROMORPHONE HYDROCHLORIDE 0.5 MILLIGRAM(S): 2 INJECTION INTRAMUSCULAR; INTRAVENOUS; SUBCUTANEOUS at 07:30

## 2019-02-13 RX ADMIN — OXYCODONE HYDROCHLORIDE 5 MILLIGRAM(S): 5 TABLET ORAL at 19:30

## 2019-02-13 RX ADMIN — ATORVASTATIN CALCIUM 40 MILLIGRAM(S): 80 TABLET, FILM COATED ORAL at 22:06

## 2019-02-13 NOTE — PROGRESS NOTE ADULT - PROBLEM SELECTOR PLAN 1
Left pleural and mediastinal tube removed  PW removed  BB increased to 50 BID  Cont ASA  No statin due to joint pain  incentive spirometry/cough pillow  increase activity

## 2019-02-13 NOTE — PROGRESS NOTE ADULT - ASSESSMENT
58M with PMH of HTN, HLD, INOCENCIA (Cr 1.8, GFR 40), current smoker and heroin abuse who was found down at his workplace yesterday after overdosing on heroin at work. He received Narcan and was brought to [facility]. At OSH, EKG concerning for IWSTEMI. Troponin T 0.354. CT head was negative and he was given ASA, Brilinta 180 x1, Plavix 600 mg x1 (unclear why both were given) and started on a heparin gtt. He was transferred to [facility] ED for cath evaluation.     He reports having intermittent chest pain or shortness of breath 1-2 weeks PTA. He currently does have R jaw tingling. His exertional capacity is limited by foot drop for which he wears a brace. He had a stress test about 5 years that he thinks was normal. He smokes 1 pack per day. Occasionally snorts heroin, denies other drug use. His wife does not know about his drug use. Started using heroin last year- twice a week, minimal amount as per patient (" 2 bags")  2/7    VSS    some anxiety,    xanax prn,  Hep Gtt,  LE splint for prior foot drop  2/8 VSS; psych consulted for hx of heroine use and anxiety  preop workup in progress; p2y12 51 today - repeat on sunday; pt currently on heparin gttp   neg chest pain/ sob  [facility] 2/11 with Dr. Christine -2nd case  pt with trepidations re: OHS - emotional support given - pre-op teaching provided  2/13 HD stable.  [place] yesterday.  Mediastinal tube and left CT removed.  CXR--no PTX.    PW removed.  IV tylenol for pain.  LE splint for foot drop.  Was able to ambulate with walker .  PT no skilled needs
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
## 2019-02-14 LAB
ANION GAP SERPL CALC-SCNC: 14 MMOL/L — SIGNIFICANT CHANGE UP (ref 5–17)
BUN SERPL-MCNC: 16 MG/DL — SIGNIFICANT CHANGE UP (ref 7–23)
CALCIUM SERPL-MCNC: 8.3 MG/DL — LOW (ref 8.4–10.5)
CHLORIDE SERPL-SCNC: 106 MMOL/L — SIGNIFICANT CHANGE UP (ref 96–108)
CO2 SERPL-SCNC: 21 MMOL/L — LOW (ref 22–31)
CREAT SERPL-MCNC: 0.88 MG/DL — SIGNIFICANT CHANGE UP (ref 0.5–1.3)
GLUCOSE BLDC GLUCOMTR-MCNC: 115 MG/DL — HIGH (ref 70–99)
GLUCOSE BLDC GLUCOMTR-MCNC: 116 MG/DL — HIGH (ref 70–99)
GLUCOSE BLDC GLUCOMTR-MCNC: 121 MG/DL — HIGH (ref 70–99)
GLUCOSE BLDC GLUCOMTR-MCNC: 123 MG/DL — HIGH (ref 70–99)
GLUCOSE SERPL-MCNC: 117 MG/DL — HIGH (ref 70–99)
HCT VFR BLD CALC: 26 % — LOW (ref 39–50)
HGB BLD-MCNC: 9.4 G/DL — LOW (ref 13–17)
MCHC RBC-ENTMCNC: 32.3 PG — SIGNIFICANT CHANGE UP (ref 27–34)
MCHC RBC-ENTMCNC: 36.3 GM/DL — HIGH (ref 32–36)
MCV RBC AUTO: 89.2 FL — SIGNIFICANT CHANGE UP (ref 80–100)
PLATELET # BLD AUTO: 354 K/UL — SIGNIFICANT CHANGE UP (ref 150–400)
POTASSIUM SERPL-MCNC: 4.1 MMOL/L — SIGNIFICANT CHANGE UP (ref 3.5–5.3)
POTASSIUM SERPL-SCNC: 4.1 MMOL/L — SIGNIFICANT CHANGE UP (ref 3.5–5.3)
RBC # BLD: 2.92 M/UL — LOW (ref 4.2–5.8)
RBC # FLD: 13 % — SIGNIFICANT CHANGE UP (ref 10.3–14.5)
SODIUM SERPL-SCNC: 141 MMOL/L — SIGNIFICANT CHANGE UP (ref 135–145)
WBC # BLD: 11.5 K/UL — HIGH (ref 3.8–10.5)
WBC # FLD AUTO: 11.5 K/UL — HIGH (ref 3.8–10.5)

## 2019-02-14 PROCEDURE — 71045 X-RAY EXAM CHEST 1 VIEW: CPT | Mod: 26

## 2019-02-14 RX ORDER — METOPROLOL TARTRATE 50 MG
25 TABLET ORAL ONCE
Qty: 0 | Refills: 0 | Status: COMPLETED | OUTPATIENT
Start: 2019-02-14 | End: 2019-02-14

## 2019-02-14 RX ORDER — METOPROLOL TARTRATE 50 MG
75 TABLET ORAL
Qty: 0 | Refills: 0 | Status: DISCONTINUED | OUTPATIENT
Start: 2019-02-14 | End: 2019-02-15

## 2019-02-14 RX ADMIN — Medication 650 MILLIGRAM(S): at 01:50

## 2019-02-14 RX ADMIN — Medication 25 MILLIGRAM(S): at 13:39

## 2019-02-14 RX ADMIN — POLYETHYLENE GLYCOL 3350 17 GRAM(S): 17 POWDER, FOR SOLUTION ORAL at 10:12

## 2019-02-14 RX ADMIN — Medication 0.25 MILLIGRAM(S): at 21:02

## 2019-02-14 RX ADMIN — ENOXAPARIN SODIUM 40 MILLIGRAM(S): 100 INJECTION SUBCUTANEOUS at 12:07

## 2019-02-14 RX ADMIN — HYDROMORPHONE HYDROCHLORIDE 0.5 MILLIGRAM(S): 2 INJECTION INTRAMUSCULAR; INTRAVENOUS; SUBCUTANEOUS at 13:10

## 2019-02-14 RX ADMIN — SODIUM CHLORIDE 3 MILLILITER(S): 9 INJECTION INTRAMUSCULAR; INTRAVENOUS; SUBCUTANEOUS at 05:36

## 2019-02-14 RX ADMIN — HYDROMORPHONE HYDROCHLORIDE 0.5 MILLIGRAM(S): 2 INJECTION INTRAMUSCULAR; INTRAVENOUS; SUBCUTANEOUS at 08:00

## 2019-02-14 RX ADMIN — HYDROMORPHONE HYDROCHLORIDE 0.5 MILLIGRAM(S): 2 INJECTION INTRAMUSCULAR; INTRAVENOUS; SUBCUTANEOUS at 04:20

## 2019-02-14 RX ADMIN — PANTOPRAZOLE SODIUM 40 MILLIGRAM(S): 20 TABLET, DELAYED RELEASE ORAL at 05:37

## 2019-02-14 RX ADMIN — GABAPENTIN 100 MILLIGRAM(S): 400 CAPSULE ORAL at 05:37

## 2019-02-14 RX ADMIN — HYDROMORPHONE HYDROCHLORIDE 0.5 MILLIGRAM(S): 2 INJECTION INTRAMUSCULAR; INTRAVENOUS; SUBCUTANEOUS at 04:00

## 2019-02-14 RX ADMIN — OXYCODONE HYDROCHLORIDE 5 MILLIGRAM(S): 5 TABLET ORAL at 10:12

## 2019-02-14 RX ADMIN — HYDROMORPHONE HYDROCHLORIDE 0.5 MILLIGRAM(S): 2 INJECTION INTRAMUSCULAR; INTRAVENOUS; SUBCUTANEOUS at 08:51

## 2019-02-14 RX ADMIN — Medication 100 MILLIGRAM(S): at 05:37

## 2019-02-14 RX ADMIN — Medication 100 MILLIGRAM(S): at 21:01

## 2019-02-14 RX ADMIN — Medication 50 MILLIGRAM(S): at 05:43

## 2019-02-14 RX ADMIN — HYDROMORPHONE HYDROCHLORIDE 0.5 MILLIGRAM(S): 2 INJECTION INTRAMUSCULAR; INTRAVENOUS; SUBCUTANEOUS at 21:00

## 2019-02-14 RX ADMIN — SODIUM CHLORIDE 3 MILLILITER(S): 9 INJECTION INTRAMUSCULAR; INTRAVENOUS; SUBCUTANEOUS at 13:37

## 2019-02-14 RX ADMIN — OXYCODONE HYDROCHLORIDE 5 MILLIGRAM(S): 5 TABLET ORAL at 00:25

## 2019-02-14 RX ADMIN — HYDROMORPHONE HYDROCHLORIDE 0.5 MILLIGRAM(S): 2 INJECTION INTRAMUSCULAR; INTRAVENOUS; SUBCUTANEOUS at 21:20

## 2019-02-14 RX ADMIN — OXYCODONE HYDROCHLORIDE 5 MILLIGRAM(S): 5 TABLET ORAL at 11:00

## 2019-02-14 RX ADMIN — Medication 650 MILLIGRAM(S): at 02:30

## 2019-02-14 RX ADMIN — HYDROMORPHONE HYDROCHLORIDE 0.5 MILLIGRAM(S): 2 INJECTION INTRAMUSCULAR; INTRAVENOUS; SUBCUTANEOUS at 12:13

## 2019-02-14 RX ADMIN — Medication 81 MILLIGRAM(S): at 12:07

## 2019-02-14 RX ADMIN — OXYCODONE HYDROCHLORIDE 5 MILLIGRAM(S): 5 TABLET ORAL at 18:28

## 2019-02-14 RX ADMIN — Medication 75 MILLIGRAM(S): at 18:27

## 2019-02-14 RX ADMIN — Medication 100 MILLIGRAM(S): at 12:07

## 2019-02-14 RX ADMIN — HYDROMORPHONE HYDROCHLORIDE 0.5 MILLIGRAM(S): 2 INJECTION INTRAMUSCULAR; INTRAVENOUS; SUBCUTANEOUS at 16:17

## 2019-02-14 RX ADMIN — GABAPENTIN 100 MILLIGRAM(S): 400 CAPSULE ORAL at 12:08

## 2019-02-14 RX ADMIN — SODIUM CHLORIDE 3 MILLILITER(S): 9 INJECTION INTRAMUSCULAR; INTRAVENOUS; SUBCUTANEOUS at 21:19

## 2019-02-14 RX ADMIN — OXYCODONE HYDROCHLORIDE 5 MILLIGRAM(S): 5 TABLET ORAL at 19:10

## 2019-02-14 RX ADMIN — HYDROMORPHONE HYDROCHLORIDE 0.5 MILLIGRAM(S): 2 INJECTION INTRAMUSCULAR; INTRAVENOUS; SUBCUTANEOUS at 17:15

## 2019-02-14 RX ADMIN — OXYCODONE HYDROCHLORIDE 5 MILLIGRAM(S): 5 TABLET ORAL at 01:10

## 2019-02-14 RX ADMIN — GABAPENTIN 100 MILLIGRAM(S): 400 CAPSULE ORAL at 21:02

## 2019-02-14 RX ADMIN — ATORVASTATIN CALCIUM 40 MILLIGRAM(S): 80 TABLET, FILM COATED ORAL at 21:01

## 2019-02-14 NOTE — PROGRESS NOTE ADULT - ASSESSMENT
58M with PMH of HTN, HLD, INOCENCIA (Cr 1.8, GFR 40), current smoker and heroin abuse who was found down at his workplace yesterday after overdosing on heroin at work. He received Narcan and was brought to [facility]. At OSH, EKG concerning for IWSTEMI. Troponin T 0.354. CT head was negative and he was given ASA, Brilinta 180 x1, Plavix 600 mg x1 (unclear why both were given) and started on a heparin gtt. He was transferred to [facility] ED for cath evaluation.     He reports having intermittent chest pain or shortness of breath 1-2 weeks PTA. He currently does have R jaw tingling. His exertional capacity is limited by foot drop for which he wears a brace. He had a stress test about 5 years that he thinks was normal. He smokes 1 pack per day. Occasionally snorts heroin, denies other drug use. His wife does not know about his drug use. Started using heroin last year- twice a week, minimal amount as per patient (" 2 bags")  2/7    VSS    some anxiety,    xanax prn,  Hep Gtt,  LE splint for prior foot drop  2/8 VSS; psych consulted for hx of heroine use and anxiety  preop workup in progress; p2y12 51 today - repeat on sunday; pt currently on heparin gttp   neg chest pain/ sob  OHS 2/11 with Dr. Christine   pt with trepidations re: OHS - emotional support given - pre-op teaching provided  2/13 HD stable.  [place] yesterday.  Mediastinal tube and left CT removed.  CXR--no PTX.    PW removed.  IV tylenol for pain.  LE splint for foot drop.  Was able to ambulate with walker .  PT no skilled needs  2/14   So1-ST   inc Lop75 q12   OOB  ambulating

## 2019-02-14 NOTE — PROGRESS NOTE ADULT - ASSESSMENT
58M with PMH of HTN, HLD, INOCENCIA (Cr 1.8, GFR 40), current smoker and heroin abuse who was found down at his workplace yesterday after overdosing on heroin at work. He received Narcan and was brought to [facility]. At OSH, EKG concerning for IWSTEMI. Troponin T 0.354. CT head was negative and he was given ASA, Brilinta 180 x1, Plavix 600 mg x1 (unclear why both were given) and started on a heparin gtt. He was transferred to [facility] ED for cath evaluation.     He reports having intermittent chest pain or shortness of breath 1-2 weeks PTA. He currently does have R jaw tingling. His exertional capacity is limited by foot drop for which he wears a brace. He had a stress test about 5 years that he thinks was normal. He smokes 1 pack per day. Occasionally snorts heroin, denies other drug use. His wife does not know about his drug use. Started using heroin last year- twice a week, minimal amount as per patient (" 2 bags")  2/7    VSS    some anxiety,    xanax prn,  Hep Gtt,  LE splint for prior foot drop  2/8 VSS; psych consulted for hx of heroine use and anxiety  preop workup in progress; p2y12 51 today - repeat on sunday; pt currently on heparin gttp   neg chest pain/ sob  O[facility] 2/11 with Dr. Christine -2nd case  pt with trepidations re: OHS - emotional support given - pre-op teaching provided  2/13 HD stable.  [place] yesterday.  Mediastinal tube and left CT removed.  CXR--no PTX.    PW removed.  IV tylenol for pain.  LE splint for foot drop.  Was able to ambulate with walker .  PT no skilled needs

## 2019-02-14 NOTE — PROGRESS NOTE ADULT - SUBJECTIVE AND OBJECTIVE BOX
VITAL SIGNS    Telemetry:    sr  90    Vital Signs Last 24 Hrs  T(C): 36.5 (02-14-19 @ 04:57), Max: 36.6 (02-13-19 @ 20:42)  T(F): 97.7 (02-14-19 @ 04:57), Max: 97.9 (02-13-19 @ 20:42)  HR: 109 (02-14-19 @ 04:57) (109 - 109)  BP: 119/76 (02-14-19 @ 04:57) (115/76 - 119/76)  RR: 18 (02-14-19 @ 10:00) (18 - 20)  SpO2: 96% (02-14-19 @ 10:00) (94% - 99%)             Daily         CAPILLARY BLOOD GLUCOSE      POCT Blood Glucose.: 123 mg/dL (14 Feb 2019 11:44)  POCT Blood Glucose.: 121 mg/dL (14 Feb 2019 07:29)  POCT Blood Glucose.: 126 mg/dL (13 Feb 2019 21:27)  POCT Blood Glucose.: 114 mg/dL (13 Feb 2019 16:36)                        PHYSICAL EXAM    Neurology: alert and oriented x 3, moves all extremities with no defecits  CV :  RRR  Sternal Wound :  CDI , Stable  Lungs:   CTA B/L  Abdomen: soft, nontender, nondistended, positive bowel sounds,  Extremities:       rt lrg splint   trace le edema  no calf tenderness

## 2019-02-15 LAB
ANION GAP SERPL CALC-SCNC: 13 MMOL/L — SIGNIFICANT CHANGE UP (ref 5–17)
BUN SERPL-MCNC: 17 MG/DL — SIGNIFICANT CHANGE UP (ref 7–23)
CALCIUM SERPL-MCNC: 8.7 MG/DL — SIGNIFICANT CHANGE UP (ref 8.4–10.5)
CHLORIDE SERPL-SCNC: 104 MMOL/L — SIGNIFICANT CHANGE UP (ref 96–108)
CO2 SERPL-SCNC: 22 MMOL/L — SIGNIFICANT CHANGE UP (ref 22–31)
CREAT SERPL-MCNC: 1.03 MG/DL — SIGNIFICANT CHANGE UP (ref 0.5–1.3)
GLUCOSE BLDC GLUCOMTR-MCNC: 111 MG/DL — HIGH (ref 70–99)
GLUCOSE BLDC GLUCOMTR-MCNC: 120 MG/DL — HIGH (ref 70–99)
GLUCOSE BLDC GLUCOMTR-MCNC: 122 MG/DL — HIGH (ref 70–99)
GLUCOSE BLDC GLUCOMTR-MCNC: 124 MG/DL — HIGH (ref 70–99)
GLUCOSE SERPL-MCNC: 111 MG/DL — HIGH (ref 70–99)
HCT VFR BLD CALC: 26.4 % — LOW (ref 39–50)
HGB BLD-MCNC: 9.3 G/DL — LOW (ref 13–17)
MCHC RBC-ENTMCNC: 31.3 PG — SIGNIFICANT CHANGE UP (ref 27–34)
MCHC RBC-ENTMCNC: 35.1 GM/DL — SIGNIFICANT CHANGE UP (ref 32–36)
MCV RBC AUTO: 89.2 FL — SIGNIFICANT CHANGE UP (ref 80–100)
PLATELET # BLD AUTO: 431 K/UL — HIGH (ref 150–400)
POTASSIUM SERPL-MCNC: 4.5 MMOL/L — SIGNIFICANT CHANGE UP (ref 3.5–5.3)
POTASSIUM SERPL-SCNC: 4.5 MMOL/L — SIGNIFICANT CHANGE UP (ref 3.5–5.3)
RBC # BLD: 2.96 M/UL — LOW (ref 4.2–5.8)
RBC # FLD: 12.7 % — SIGNIFICANT CHANGE UP (ref 10.3–14.5)
SODIUM SERPL-SCNC: 139 MMOL/L — SIGNIFICANT CHANGE UP (ref 135–145)
WBC # BLD: 10.5 K/UL — SIGNIFICANT CHANGE UP (ref 3.8–10.5)
WBC # FLD AUTO: 10.5 K/UL — SIGNIFICANT CHANGE UP (ref 3.8–10.5)

## 2019-02-15 RX ORDER — METOPROLOL TARTRATE 50 MG
100 TABLET ORAL DAILY
Qty: 0 | Refills: 0 | Status: DISCONTINUED | OUTPATIENT
Start: 2019-02-16 | End: 2019-02-16

## 2019-02-15 RX ORDER — METOPROLOL TARTRATE 50 MG
75 TABLET ORAL ONCE
Qty: 0 | Refills: 0 | Status: COMPLETED | OUTPATIENT
Start: 2019-02-15 | End: 2019-02-15

## 2019-02-15 RX ORDER — HYDROMORPHONE HYDROCHLORIDE 2 MG/ML
4 INJECTION INTRAMUSCULAR; INTRAVENOUS; SUBCUTANEOUS
Qty: 0 | Refills: 0 | Status: DISCONTINUED | OUTPATIENT
Start: 2019-02-15 | End: 2019-02-16

## 2019-02-15 RX ORDER — HYDROMORPHONE HYDROCHLORIDE 2 MG/ML
6 INJECTION INTRAMUSCULAR; INTRAVENOUS; SUBCUTANEOUS
Qty: 0 | Refills: 0 | Status: DISCONTINUED | OUTPATIENT
Start: 2019-02-15 | End: 2019-02-16

## 2019-02-15 RX ADMIN — HYDROMORPHONE HYDROCHLORIDE 4 MILLIGRAM(S): 2 INJECTION INTRAMUSCULAR; INTRAVENOUS; SUBCUTANEOUS at 20:15

## 2019-02-15 RX ADMIN — POLYETHYLENE GLYCOL 3350 17 GRAM(S): 17 POWDER, FOR SOLUTION ORAL at 13:02

## 2019-02-15 RX ADMIN — Medication 75 MILLIGRAM(S): at 10:01

## 2019-02-15 RX ADMIN — HYDROMORPHONE HYDROCHLORIDE 0.5 MILLIGRAM(S): 2 INJECTION INTRAMUSCULAR; INTRAVENOUS; SUBCUTANEOUS at 06:15

## 2019-02-15 RX ADMIN — HYDROMORPHONE HYDROCHLORIDE 6 MILLIGRAM(S): 2 INJECTION INTRAMUSCULAR; INTRAVENOUS; SUBCUTANEOUS at 15:15

## 2019-02-15 RX ADMIN — Medication 100 MILLIGRAM(S): at 05:57

## 2019-02-15 RX ADMIN — HYDROMORPHONE HYDROCHLORIDE 6 MILLIGRAM(S): 2 INJECTION INTRAMUSCULAR; INTRAVENOUS; SUBCUTANEOUS at 14:31

## 2019-02-15 RX ADMIN — Medication 75 MILLIGRAM(S): at 05:57

## 2019-02-15 RX ADMIN — Medication 0.25 MILLIGRAM(S): at 21:31

## 2019-02-15 RX ADMIN — HYDROMORPHONE HYDROCHLORIDE 0.5 MILLIGRAM(S): 2 INJECTION INTRAMUSCULAR; INTRAVENOUS; SUBCUTANEOUS at 01:20

## 2019-02-15 RX ADMIN — HYDROMORPHONE HYDROCHLORIDE 0.5 MILLIGRAM(S): 2 INJECTION INTRAMUSCULAR; INTRAVENOUS; SUBCUTANEOUS at 05:55

## 2019-02-15 RX ADMIN — HYDROMORPHONE HYDROCHLORIDE 6 MILLIGRAM(S): 2 INJECTION INTRAMUSCULAR; INTRAVENOUS; SUBCUTANEOUS at 11:30

## 2019-02-15 RX ADMIN — OXYCODONE HYDROCHLORIDE 5 MILLIGRAM(S): 5 TABLET ORAL at 09:00

## 2019-02-15 RX ADMIN — Medication 81 MILLIGRAM(S): at 13:01

## 2019-02-15 RX ADMIN — GABAPENTIN 100 MILLIGRAM(S): 400 CAPSULE ORAL at 05:56

## 2019-02-15 RX ADMIN — SODIUM CHLORIDE 3 MILLILITER(S): 9 INJECTION INTRAMUSCULAR; INTRAVENOUS; SUBCUTANEOUS at 05:51

## 2019-02-15 RX ADMIN — GABAPENTIN 100 MILLIGRAM(S): 400 CAPSULE ORAL at 13:01

## 2019-02-15 RX ADMIN — SODIUM CHLORIDE 3 MILLILITER(S): 9 INJECTION INTRAMUSCULAR; INTRAVENOUS; SUBCUTANEOUS at 21:27

## 2019-02-15 RX ADMIN — SODIUM CHLORIDE 3 MILLILITER(S): 9 INJECTION INTRAMUSCULAR; INTRAVENOUS; SUBCUTANEOUS at 13:02

## 2019-02-15 RX ADMIN — Medication 100 MILLIGRAM(S): at 13:01

## 2019-02-15 RX ADMIN — OXYCODONE HYDROCHLORIDE 5 MILLIGRAM(S): 5 TABLET ORAL at 08:15

## 2019-02-15 RX ADMIN — PANTOPRAZOLE SODIUM 40 MILLIGRAM(S): 20 TABLET, DELAYED RELEASE ORAL at 05:56

## 2019-02-15 RX ADMIN — HYDROMORPHONE HYDROCHLORIDE 6 MILLIGRAM(S): 2 INJECTION INTRAMUSCULAR; INTRAVENOUS; SUBCUTANEOUS at 10:39

## 2019-02-15 RX ADMIN — HYDROMORPHONE HYDROCHLORIDE 0.5 MILLIGRAM(S): 2 INJECTION INTRAMUSCULAR; INTRAVENOUS; SUBCUTANEOUS at 01:40

## 2019-02-15 RX ADMIN — ENOXAPARIN SODIUM 40 MILLIGRAM(S): 100 INJECTION SUBCUTANEOUS at 13:02

## 2019-02-15 RX ADMIN — HYDROMORPHONE HYDROCHLORIDE 4 MILLIGRAM(S): 2 INJECTION INTRAMUSCULAR; INTRAVENOUS; SUBCUTANEOUS at 19:25

## 2019-02-15 RX ADMIN — ATORVASTATIN CALCIUM 40 MILLIGRAM(S): 80 TABLET, FILM COATED ORAL at 21:26

## 2019-02-15 RX ADMIN — GABAPENTIN 100 MILLIGRAM(S): 400 CAPSULE ORAL at 21:26

## 2019-02-15 NOTE — PROGRESS NOTE ADULT - ASSESSMENT
58M with PMH of HTN, HLD, INOCENCIA (Cr 1.8, GFR 40), current smoker and heroin abuse who was found down at his workplace yesterday after overdosing on heroin at work. He received Narcan and was brought to [facility]. At OSH, EKG concerning for IWSTEMI. Troponin T 0.354. CT head was negative and he was given ASA, Brilinta 180 x1, Plavix 600 mg x1 (unclear why both were given) and started on a heparin gtt. He was transferred to [facility] ED for cath evaluation.   2/7 VSS; some anxiety, xanax prn, Hep Gtt, LE splint for prior foot drop  2/8 VSS; psych consulted for hx of heroine use and anxiety  preop workup in progress; p2y12 51 today - repeat on Sunday pt currently on heparin gttp   neg chest pain/ sob  2/11 s/p CABG X 3 LIMA   Post op Course:   Hypoxemia requiring high flow   2/13 HD stable. Transferred [place] yesterday. Mediastinal tube and left CT removed.  CXR--no PTX. PW removed.  IV Tylenol for pain.  LE splint for foot drop. Was able to ambulate with walker.  PT no skilled needs  2/14 SR-ST --> BB increase; Lopressor 75 mg PO BID; OOB and ambulating  2/15 VVS; Continue with current medication regimen. Change Lopressor to Toprol 150 mg XL daily   Disposition: Home PT in AM

## 2019-02-15 NOTE — PROGRESS NOTE ADULT - SUBJECTIVE AND OBJECTIVE BOX
VITAL SIGNS    Subjective: "I'm feeling ok." Denies CP, palpitation, SOB, CAMARENA, HA, dizziness, N/V/D, fever or chills.  No acute event noted overnight.     Telemetry:  NSR      Vital Signs Last 24 Hrs  T(C): 36.6 (02-15-19 @ 05:12), Max: 36.7 (19 @ 20:03)  T(F): 97.9 (02-15-19 @ 05:12), Max: 98 (19 @ 20:03)  HR: 112 (02-15-19 @ 05:12) (111 - 120)  BP: 127/85 (02-15-19 @ 05:12) (96/63 - 127/85)  RR: 18 (02-15-19 @ 05:12) (18 - 18)  SpO2: 95% (02-15-19 @ 05:12) (91% - 95%)            @ 07:01  -  02-15 @ 07:00  --------------------------------------------------------  IN: 600 mL / OUT: 2450 mL / NET: -1850 mL    02-15 @ 07:01  -  02-15 @ 13:04  --------------------------------------------------------  IN: 120 mL / OUT: 0 mL / NET: 120 mL    Daily     Daily Weight in k.6 (15 Feb 2019 07:15)    CAPILLARY BLOOD GLUCOSE    POCT Blood Glucose.: 124 mg/dL (15 Feb 2019 11:59)  POCT Blood Glucose.: 122 mg/dL (15 Feb 2019 07:42)  POCT Blood Glucose.: 116 mg/dL (2019 21:36)  POCT Blood Glucose.: 115 mg/dL (2019 16:31)        PHYSICAL EXAM    Neurology: alert and oriented x 3, nonfocal, no gross deficits    CV: (+) S1 and S2, No murmurs, rubs, gallops or clicks     Sternal Wound:  MSI -->with coverlet dressing -->CDI , sternum stable; PW --> Isolated     Lungs: CTA B/L     Abdomen: soft, nontender, nondistended, positive bowel sounds, (+) Flatus; (+) BM     :  Voiding               Extremities:  B/L LE (+) trace edema; negative calf tenderness; (+) 2 DP palpable        acetaminophen Tablet  650 milliGRAM(s) Oral every 6 hours PRN  ALPRAZolam 0.25 milliGRAM(s) Oral at bedtime PRN  aspirin enteric coated 81 milliGRAM(s) Oral daily  atorvastatin 40 milliGRAM(s) Oral at bedtime  docusate sodium 100 milliGRAM(s) Oral three times a day  enoxaparin Injectable 40 milliGRAM(s) SubCutaneous daily  gabapentin 100 milliGRAM(s) Oral every 8 hours  HYDROmorphone Tablet 4 milliGRAM(s) Oral every 3 hours PRN  HYDROmorphone Tablet 6 milliGRAM(s) Oral every 3 hours PRN  insulin lispro (HumaLOG) corrective regimen sliding scale SubCutaneous three times a day before meals  insulin lispro (HumaLOG) corrective regimen sliding scale SubCutaneous at bedtime  pantoprazole Tablet 40 milliGRAM(s) Oral before breakfast  polyethylene glycol 3350 17 Gram(s) Oral daily    Physical Therapy Rec:   Home  [  ]   Home w/ PT  [X]  Rehab  [  ]    Discussed with Cardiothoracic Team at AM rounds.

## 2019-02-15 NOTE — PROGRESS NOTE ADULT - PROBLEM SELECTOR PLAN 1
Continue with  mg PO Daily.   Change lopressor to Toprol 150 mg PO daily    Pain Management   No statin 2/2 SE   Increase activity as tolerated.   Encourage Chest PT / Pulmonary toileting and Incentive spirometry every 1 hour x 10 while awake.   Continue with PUD and DVT prophylaxis.   D/C plan home PT in AM   Plan of care discussed with attending

## 2019-02-16 ENCOUNTER — TRANSCRIPTION ENCOUNTER (OUTPATIENT)
Age: 59
End: 2019-02-16

## 2019-02-16 VITALS
SYSTOLIC BLOOD PRESSURE: 108 MMHG | DIASTOLIC BLOOD PRESSURE: 55 MMHG | TEMPERATURE: 99 F | HEART RATE: 102 BPM | RESPIRATION RATE: 18 BRPM | OXYGEN SATURATION: 93 %

## 2019-02-16 LAB
ANION GAP SERPL CALC-SCNC: 15 MMOL/L — SIGNIFICANT CHANGE UP (ref 5–17)
BUN SERPL-MCNC: 16 MG/DL — SIGNIFICANT CHANGE UP (ref 7–23)
CALCIUM SERPL-MCNC: 8.7 MG/DL — SIGNIFICANT CHANGE UP (ref 8.4–10.5)
CHLORIDE SERPL-SCNC: 103 MMOL/L — SIGNIFICANT CHANGE UP (ref 96–108)
CO2 SERPL-SCNC: 20 MMOL/L — LOW (ref 22–31)
CREAT SERPL-MCNC: 0.96 MG/DL — SIGNIFICANT CHANGE UP (ref 0.5–1.3)
GLUCOSE BLDC GLUCOMTR-MCNC: 117 MG/DL — HIGH (ref 70–99)
GLUCOSE BLDC GLUCOMTR-MCNC: 119 MG/DL — HIGH (ref 70–99)
GLUCOSE SERPL-MCNC: 109 MG/DL — HIGH (ref 70–99)
HCT VFR BLD CALC: 26.9 % — LOW (ref 39–50)
HGB BLD-MCNC: 9.4 G/DL — LOW (ref 13–17)
MCHC RBC-ENTMCNC: 31.3 PG — SIGNIFICANT CHANGE UP (ref 27–34)
MCHC RBC-ENTMCNC: 35 GM/DL — SIGNIFICANT CHANGE UP (ref 32–36)
MCV RBC AUTO: 89.4 FL — SIGNIFICANT CHANGE UP (ref 80–100)
PLATELET # BLD AUTO: 467 K/UL — HIGH (ref 150–400)
POTASSIUM SERPL-MCNC: 4.3 MMOL/L — SIGNIFICANT CHANGE UP (ref 3.5–5.3)
POTASSIUM SERPL-SCNC: 4.3 MMOL/L — SIGNIFICANT CHANGE UP (ref 3.5–5.3)
RBC # BLD: 3.01 M/UL — LOW (ref 4.2–5.8)
RBC # FLD: 13.3 % — SIGNIFICANT CHANGE UP (ref 10.3–14.5)
SODIUM SERPL-SCNC: 138 MMOL/L — SIGNIFICANT CHANGE UP (ref 135–145)
WBC # BLD: 10.1 K/UL — SIGNIFICANT CHANGE UP (ref 3.8–10.5)
WBC # FLD AUTO: 10.1 K/UL — SIGNIFICANT CHANGE UP (ref 3.8–10.5)

## 2019-02-16 PROCEDURE — 84480 ASSAY TRIIODOTHYRONINE (T3): CPT

## 2019-02-16 PROCEDURE — 86850 RBC ANTIBODY SCREEN: CPT

## 2019-02-16 PROCEDURE — 80307 DRUG TEST PRSMV CHEM ANLYZR: CPT

## 2019-02-16 PROCEDURE — 97161 PT EVAL LOW COMPLEX 20 MIN: CPT

## 2019-02-16 PROCEDURE — 82330 ASSAY OF CALCIUM: CPT

## 2019-02-16 PROCEDURE — 86900 BLOOD TYPING SEROLOGIC ABO: CPT

## 2019-02-16 PROCEDURE — 80053 COMPREHEN METABOLIC PANEL: CPT

## 2019-02-16 PROCEDURE — 71045 X-RAY EXAM CHEST 1 VIEW: CPT

## 2019-02-16 PROCEDURE — 84132 ASSAY OF SERUM POTASSIUM: CPT

## 2019-02-16 PROCEDURE — C8929: CPT

## 2019-02-16 PROCEDURE — 94002 VENT MGMT INPAT INIT DAY: CPT

## 2019-02-16 PROCEDURE — 86901 BLOOD TYPING SEROLOGIC RH(D): CPT

## 2019-02-16 PROCEDURE — P9016: CPT

## 2019-02-16 PROCEDURE — 94010 BREATHING CAPACITY TEST: CPT

## 2019-02-16 PROCEDURE — 84484 ASSAY OF TROPONIN QUANT: CPT

## 2019-02-16 PROCEDURE — 85610 PROTHROMBIN TIME: CPT

## 2019-02-16 PROCEDURE — 82947 ASSAY GLUCOSE BLOOD QUANT: CPT

## 2019-02-16 PROCEDURE — 85027 COMPLETE CBC AUTOMATED: CPT

## 2019-02-16 PROCEDURE — 99152 MOD SED SAME PHYS/QHP 5/>YRS: CPT

## 2019-02-16 PROCEDURE — C1889: CPT

## 2019-02-16 PROCEDURE — 82962 GLUCOSE BLOOD TEST: CPT

## 2019-02-16 PROCEDURE — 86923 COMPATIBILITY TEST ELECTRIC: CPT

## 2019-02-16 PROCEDURE — 83605 ASSAY OF LACTIC ACID: CPT

## 2019-02-16 PROCEDURE — 82565 ASSAY OF CREATININE: CPT

## 2019-02-16 PROCEDURE — 80048 BASIC METABOLIC PNL TOTAL CA: CPT

## 2019-02-16 PROCEDURE — 86891 AUTOLOGOUS BLOOD OP SALVAGE: CPT

## 2019-02-16 PROCEDURE — 80076 HEPATIC FUNCTION PANEL: CPT

## 2019-02-16 PROCEDURE — 84443 ASSAY THYROID STIM HORMONE: CPT

## 2019-02-16 PROCEDURE — 82803 BLOOD GASES ANY COMBINATION: CPT

## 2019-02-16 PROCEDURE — 84295 ASSAY OF SERUM SODIUM: CPT

## 2019-02-16 PROCEDURE — C1894: CPT

## 2019-02-16 PROCEDURE — P9041: CPT

## 2019-02-16 PROCEDURE — 99285 EMERGENCY DEPT VISIT HI MDM: CPT

## 2019-02-16 PROCEDURE — 93005 ELECTROCARDIOGRAM TRACING: CPT

## 2019-02-16 PROCEDURE — 85014 HEMATOCRIT: CPT

## 2019-02-16 PROCEDURE — 93458 L HRT ARTERY/VENTRICLE ANGIO: CPT

## 2019-02-16 PROCEDURE — 83880 ASSAY OF NATRIURETIC PEPTIDE: CPT

## 2019-02-16 PROCEDURE — 85576 BLOOD PLATELET AGGREGATION: CPT

## 2019-02-16 PROCEDURE — 82550 ASSAY OF CK (CPK): CPT

## 2019-02-16 PROCEDURE — 83036 HEMOGLOBIN GLYCOSYLATED A1C: CPT

## 2019-02-16 PROCEDURE — 85384 FIBRINOGEN ACTIVITY: CPT

## 2019-02-16 PROCEDURE — C1887: CPT

## 2019-02-16 PROCEDURE — 84436 ASSAY OF TOTAL THYROXINE: CPT

## 2019-02-16 PROCEDURE — C1769: CPT

## 2019-02-16 PROCEDURE — 82435 ASSAY OF BLOOD CHLORIDE: CPT

## 2019-02-16 PROCEDURE — P9047: CPT

## 2019-02-16 PROCEDURE — 85730 THROMBOPLASTIN TIME PARTIAL: CPT

## 2019-02-16 PROCEDURE — 82553 CREATINE MB FRACTION: CPT

## 2019-02-16 PROCEDURE — 36430 TRANSFUSION BLD/BLD COMPNT: CPT

## 2019-02-16 RX ORDER — LOSARTAN POTASSIUM 100 MG/1
1 TABLET, FILM COATED ORAL
Qty: 0 | Refills: 0 | COMMUNITY

## 2019-02-16 RX ORDER — FENOFIBRATE,MICRONIZED 130 MG
1 CAPSULE ORAL
Qty: 0 | Refills: 0 | COMMUNITY

## 2019-02-16 RX ORDER — GABAPENTIN 400 MG/1
1 CAPSULE ORAL
Qty: 90 | Refills: 0 | OUTPATIENT
Start: 2019-02-16 | End: 2019-03-17

## 2019-02-16 RX ORDER — ACETAMINOPHEN 500 MG
2 TABLET ORAL
Qty: 0 | Refills: 0 | COMMUNITY
Start: 2019-02-16

## 2019-02-16 RX ORDER — HYDROMORPHONE HYDROCHLORIDE 2 MG/ML
1 INJECTION INTRAMUSCULAR; INTRAVENOUS; SUBCUTANEOUS
Qty: 40 | Refills: 0 | OUTPATIENT
Start: 2019-02-16 | End: 2019-02-20

## 2019-02-16 RX ORDER — PANTOPRAZOLE SODIUM 20 MG/1
1 TABLET, DELAYED RELEASE ORAL
Qty: 30 | Refills: 0 | OUTPATIENT
Start: 2019-02-16 | End: 2019-03-17

## 2019-02-16 RX ORDER — METOPROLOL TARTRATE 50 MG
1 TABLET ORAL
Qty: 30 | Refills: 0 | OUTPATIENT
Start: 2019-02-16 | End: 2019-03-17

## 2019-02-16 RX ORDER — FENOFIBRATE,MICRONIZED 130 MG
1 CAPSULE ORAL
Qty: 30 | Refills: 0 | OUTPATIENT
Start: 2019-02-16 | End: 2019-03-17

## 2019-02-16 RX ORDER — DOCUSATE SODIUM 100 MG
1 CAPSULE ORAL
Qty: 0 | Refills: 0 | COMMUNITY
Start: 2019-02-16

## 2019-02-16 RX ADMIN — HYDROMORPHONE HYDROCHLORIDE 4 MILLIGRAM(S): 2 INJECTION INTRAMUSCULAR; INTRAVENOUS; SUBCUTANEOUS at 08:50

## 2019-02-16 RX ADMIN — HYDROMORPHONE HYDROCHLORIDE 4 MILLIGRAM(S): 2 INJECTION INTRAMUSCULAR; INTRAVENOUS; SUBCUTANEOUS at 02:30

## 2019-02-16 RX ADMIN — PANTOPRAZOLE SODIUM 40 MILLIGRAM(S): 20 TABLET, DELAYED RELEASE ORAL at 06:05

## 2019-02-16 RX ADMIN — HYDROMORPHONE HYDROCHLORIDE 4 MILLIGRAM(S): 2 INJECTION INTRAMUSCULAR; INTRAVENOUS; SUBCUTANEOUS at 01:50

## 2019-02-16 RX ADMIN — Medication 81 MILLIGRAM(S): at 11:42

## 2019-02-16 RX ADMIN — SODIUM CHLORIDE 3 MILLILITER(S): 9 INJECTION INTRAMUSCULAR; INTRAVENOUS; SUBCUTANEOUS at 06:05

## 2019-02-16 RX ADMIN — Medication 100 MILLIGRAM(S): at 06:05

## 2019-02-16 RX ADMIN — HYDROMORPHONE HYDROCHLORIDE 4 MILLIGRAM(S): 2 INJECTION INTRAMUSCULAR; INTRAVENOUS; SUBCUTANEOUS at 11:41

## 2019-02-16 RX ADMIN — HYDROMORPHONE HYDROCHLORIDE 4 MILLIGRAM(S): 2 INJECTION INTRAMUSCULAR; INTRAVENOUS; SUBCUTANEOUS at 08:20

## 2019-02-16 RX ADMIN — GABAPENTIN 100 MILLIGRAM(S): 400 CAPSULE ORAL at 06:05

## 2019-02-16 NOTE — PROGRESS NOTE ADULT - PROBLEM SELECTOR PLAN 1
Continue with  mg PO Daily.   Change Toprol 100 mg PO daily    Pain Management   No statin 2/2 SE   Increase activity as tolerated.   Encourage Chest PT / Pulmonary toileting and Incentive spirometry every 1 hour x 10 while awake.   Continue with PUD and DVT prophylaxis.   D/C plan home PT   Plan of care discussed with attending

## 2019-02-16 NOTE — DISCHARGE NOTE ADULT - HOSPITAL COURSE
58M with PMH of HTN, HLD, INOCENCIA (Cr 1.8, GFR 40), current smoker and heroin abuse who was found down at his workplace yesterday after overdosing on heroin at work. He received Narcan and was brought to [facility]. At OSH, EKG concerning for IWSTEMI. Troponin T 0.354. CT head was negative and he was given ASA, Brilinta 180 x1, Plavix 600 mg x1 (unclear why both were given) and started on a heparin gtt. He was transferred to [facility] ED for cath evaluation.   2/7 VSS; some anxiety, xanax prn, Hep Gtt, LE splint for prior foot drop  2/8 VSS; psych consulted for hx of heroine use and anxiety  preop workup in progress; p2y12 51 today - repeat on Sunday pt currently on heparin gttp   neg chest pain/ sob  2/11 s/p CABG X 3 LIMA   Post op Course:   Hypoxemia requiring high flow   2/13 HD stable. Transferred from [place] yesterday. Mediastinal tube and left CT removed.  CXR--no PTX. PW removed.  IV Tylenol for pain.  LE splint for foot drop. Was able to ambulate with walker.  PT no skilled needs  2/14 SR-ST --> BB increase; Lopressor 75 mg PO BID; OOB and ambulating  2/15 VVS; Continue with current medication regimen. Change Lopressor to Toprol 150 mg XL daily   Disposition: Home PT

## 2019-02-16 NOTE — DISCHARGE NOTE ADULT - CARE PROVIDER_API CALL
Reggie Christine)  Surgery; Thoracic and Cardiac Surgery  [address] [place] [address]  Phone: [phone]  Fax: [phone]  Follow Up Time:     primary care/cardiologist MDs,   Phone: (   )    -  Fax: (   )    -  Follow Up Time:

## 2019-02-16 NOTE — PROGRESS NOTE ADULT - SUBJECTIVE AND OBJECTIVE BOX
Operation: (date) CABG x 3    Surgeon:Reggie Christine    Wound infection  yes [  ]    No   [x  ]  pleural effsuison  yes [  ]    No   [ x ]  post op afib           yes [  ]    No   [x  ]       Telemetry  Vital Signs Last 24 Hrs  T(C): 37 (16 Feb 2019 05:00), Max: 37 (16 Feb 2019 05:00)  HR: 102 (16 Feb 2019 05:00) (102 - 111)  BP: 108/55 (16 Feb 2019 05:00) (108/55 - 120/77)  BP(mean): 92 (15 Feb 2019 15:08) (92 - 92)  SpO2: 93% (16 Feb 2019 05:00) (93% - 97%)    LABS:                        9.4    10.1  )-----------( 467      ( 16 Feb 2019 07:39 )             26.9     02-16    138  |  103  |  16  ----------------------------<  109<H>  4.3   |  20<L>  |  0.96        Discharge CXR:  QTC interval if on amiodorone    Isolated CABG:   ASA or Plavix ordered: yes [x  ]  No [  ] if no, why?  Beta Blocker ordered:  yes  [x  ]  No [  ]  If no, why?  Statin ordered:  yes  [x  ]  no [  ] if no,  why?  ACE/ARB ordered for MI or HF (EF<40%)  yes [  ]  no  [  x] if no why? EF 50  COUMADIN:  Yes/No. N       DOSE:             FREQUENCY:             TARGET INR:               MD following INR: NA    PHYSICAL EXAM:  Cardiovascular:  S1S1 regular  Respiratory:CTA  Gastrointestinal: +bs/soft ND/NT-last BM-2/15  Extremities:  no edema, , +dp b/l, no calf tenderness  Incision Sites: CDI    Referring Cardiologist & Fax #:  Primary Care Physician & Fax #::

## 2019-02-16 NOTE — DISCHARGE NOTE ADULT - CARE PLAN
Principal Discharge DX:	S/P CABG x 3  Goal:	full recovery  Assessment and plan of treatment:	Please Call DR Viveros's office on Monday to schedule a follow up appointment with him. Please see contact below.  Please follow up with all your other providers-including your cardiologist/primary care providers in 2 weeks.  Please follow the Do's and Donts of Cardiac surgery

## 2019-02-16 NOTE — DISCHARGE NOTE ADULT - PROVIDER TOKENS
PROVIDER:[TOKEN:[[id number]]],FREE:[LAST:[primary care/cardiologist MDs],PHONE:[(   )    -],FAX:[(   )    -]]

## 2019-02-16 NOTE — DISCHARGE NOTE ADULT - PATIENT PORTAL LINK FT
You can access the [facility] Patient Portal, offered by [facility], by registering with the following website: [email or web address]

## 2019-02-16 NOTE — PROGRESS NOTE ADULT - ASSESSMENT
58M with PMH of HTN, HLD, INOCENCIA (Cr 1.8, GFR 40), current smoker and heroin abuse who was found down at his workplace yesterday after overdosing on heroin at work. He received Narcan and was brought to [facility]. At OSH, EKG concerning for IWSTEMI. Troponin T 0.354. CT head was negative and he was given ASA, Brilinta 180 x1, Plavix 600 mg x1 (unclear why both were given) and started on a heparin gtt. He was transferred to [facility] ED for cath evaluation.   2/7 VSS; some anxiety, xanax prn, Hep Gtt, LE splint for prior foot drop  2/8 VSS; psych consulted for hx of heroine use and anxiety  preop workup in progress; p2y12 51 today - repeat on Sunday pt currently on heparin gttp   neg chest pain/ sob  2/11 s/p CABG X 3 LIMA   Post op Course:   Hypoxemia requiring high flow   2/13 HD stable. Transfer[place] yesterday. Mediastinal tube and left CT removed.  CXR--no PTX. PW removed.  IV Tylenol for pain.  LE splint for foot drop. Was able to ambulate with walker.  PT no skilled needs  2/14 SR-ST --> BB increase; Lopressor 75 mg PO BID; OOB and ambulating  2/15 VVS; Continue with current medication regimen. Change Lopressor to Toprol 150 mg XL daily   Disposition: Home PT

## 2019-02-16 NOTE — PROGRESS NOTE ADULT - PROBLEM SELECTOR PROBLEM 2
Right foot drop
Heroin abuse
Right foot drop

## 2019-02-16 NOTE — PROGRESS NOTE ADULT - PROBLEM SELECTOR PROBLEM 1
3-vessel CAD
S/P CABG x 3

## 2019-02-16 NOTE — DISCHARGE NOTE ADULT - MEDICATION SUMMARY - MEDICATIONS TO STOP TAKING
I will STOP taking the medications listed below when I get home from the hospital:    losartan 25 mg oral tablet  -- 1 tab(s) by mouth once a day    Lyrica 50 mg oral capsule  -- 1 cap(s) by mouth 2 times a day

## 2019-02-16 NOTE — DISCHARGE NOTE ADULT - PLAN OF CARE
full recovery Please Call DR Viveros's office on Monday to schedule a follow up appointment with him. Please see contact below.  Please follow up with all your other providers-including your cardiologist/primary care providers in 2 weeks.  Please follow the Do's and Donts of Cardiac surgery

## 2019-02-16 NOTE — DISCHARGE NOTE ADULT - MEDICATION SUMMARY - MEDICATIONS TO TAKE
I will START or STAY ON the medications listed below when I get home from the hospital:    aspirin 81 mg oral tablet  -- 1 tab(s) by mouth once a day  -- Indication: For graft patency    acetaminophen 325 mg oral tablet  -- 2 tab(s) by mouth every 6 hours, As needed, Temp greater or equal to 38.5C (101.3F), Mild Pain (1 - 3)  -- Indication: For mild pain    HYDROmorphone 4 mg oral tablet  -- 1 tab(s) by mouth every 3 hours, As needed, Moderate Pain (4 - 6) MDD:8 tabs  -- Indication: For moderate to severe pain    gabapentin 100 mg oral capsule  -- 1 cap(s) by mouth every 8 hours  -- Indication: For nerve pain    fenofibrate 160 mg oral tablet  -- 1 tab(s) by mouth once a day  -- Indication: For cholesterol    metoprolol succinate 100 mg oral tablet, extended release  -- 1 tab(s) by mouth once a day  -- Indication: For Heart rate control    docusate sodium 100 mg oral capsule  -- 1 cap(s) by mouth 3 times a day  -- Indication: For constipation    pantoprazole 40 mg oral delayed release tablet  -- 1 tab(s) by mouth once a day (before a meal)  -- Indication: For Reflux

## 2019-02-19 ENCOUNTER — APPOINTMENT (OUTPATIENT)
Age: 59
End: 2019-02-19
Payer: COMMERCIAL

## 2019-02-19 VITALS
DIASTOLIC BLOOD PRESSURE: 72 MMHG | HEART RATE: 90 BPM | OXYGEN SATURATION: 98 % | SYSTOLIC BLOOD PRESSURE: 118 MMHG | RESPIRATION RATE: 16 BRPM

## 2019-02-19 DIAGNOSIS — G57.91 UNSPECIFIED MONONEUROPATHY OF RIGHT LOWER LIMB: ICD-10-CM

## 2019-02-19 DIAGNOSIS — F17.200 NICOTINE DEPENDENCE, UNSPECIFIED, UNCOMPLICATED: ICD-10-CM

## 2019-02-19 PROBLEM — F11.10 OPIOID ABUSE, UNCOMPLICATED: Chronic | Status: ACTIVE | Noted: 2019-02-06

## 2019-02-19 PROBLEM — E78.5 HYPERLIPIDEMIA, UNSPECIFIED: Chronic | Status: ACTIVE | Noted: 2019-02-06

## 2019-02-19 PROBLEM — I10 ESSENTIAL (PRIMARY) HYPERTENSION: Chronic | Status: ACTIVE | Noted: 2019-02-06

## 2019-02-19 PROCEDURE — 99024 POSTOP FOLLOW-UP VISIT: CPT

## 2019-02-19 RX ORDER — ASPIRIN 81 MG
81 TABLET, DELAYED RELEASE (ENTERIC COATED) ORAL DAILY
Refills: 0 | Status: ACTIVE | COMMUNITY

## 2019-02-19 NOTE — PHYSICAL EXAM
[Auscultation Breath Sounds / Voice Sounds] : lungs were clear to auscultation bilaterally [Heart Rate And Rhythm] : heart rate was normal and rhythm regular [Heart Sounds] : normal S1 and S2 [Heart Sounds Gallop] : no gallops [Murmurs] : no murmurs [Heart Sounds Pericardial Friction Rub] : no pericardial rub [Examination Of The Chest] : the chest was normal in appearance [Chest Visual Inspection Thoracic Asymmetry] : no chest asymmetry [Diminished Respiratory Excursion] : normal chest expansion [FreeTextEntry1] : Sternal incision D&I [2+] : left 2+ [Bowel Sounds] : normal bowel sounds [Abdomen Soft] : soft [Abdomen Tenderness] : non-tender [Abdomen Mass (___ Cm)] : no abdominal mass palpated [Abnormal Walk] : normal gait [Nail Clubbing] : no clubbing  or cyanosis of the fingernails [Musculoskeletal - Swelling] : no joint swelling seen [Motor Tone] : muscle strength and tone were normal [Skin Color & Pigmentation] : normal skin color and pigmentation [Skin Turgor] : normal skin turgor [] : no rash [Deep Tendon Reflexes (DTR)] : deep tendon reflexes were 2+ and symmetric [Sensation] : the sensory exam was normal to light touch and pinprick [No Focal Deficits] : no focal deficits

## 2019-02-19 NOTE — HISTORY OF PRESENT ILLNESS
[FreeTextEntry1] : This is a 57 yo current smoker s/p CABG by Dr. Christine on 2/11/19.  Pt reports that he is doing well.  Denies fever, SOB, Pain, palpitations.  Reports that he still has an urge to smoke.

## 2019-02-19 NOTE — ASSESSMENT
[FreeTextEntry1] : 57 yo S/P CABG. Doing well.  He reports not having a cardiologist and I have referred him to [facility] which is local to him.  He reports an urge to smoke and advised that I will refer him to Tobacco Cessation Program and agrees.\par Post Operative Care:\par Pt advised of importance of daily weights. Pt instructed on how to use incentive spirometer every hour, demonstrated proper use. Also advised to cleanse incisions daily with mild soap and water and to avoid lotions, powders, ointments or creams near or on the incision. Low salt, low fat diet encouraged and discussed. Pt advised to avoid heavy lifting or straining. \par \par Follow Your Heart team will continue to follow up with pt status. \par NP/CCC roles explained with pt understanding, contact information provided. Pt agrees to call with any questions, issues or concerns.  Worsening symptoms reviewed with patient understanding.  \par \par

## 2019-02-28 PROBLEM — Z95.1 S/P CABG X 3: Status: ACTIVE | Noted: 2019-02-28

## 2019-02-28 PROBLEM — Z09 POSTOP CHECK: Status: ACTIVE | Noted: 2019-02-28

## 2019-03-07 ENCOUNTER — APPOINTMENT (OUTPATIENT)
Dept: CARDIOTHORACIC SURGERY | Facility: CLINIC | Age: 59
End: 2019-03-07
Payer: COMMERCIAL

## 2019-03-07 VITALS
HEIGHT: 68 IN | WEIGHT: 210 LBS | SYSTOLIC BLOOD PRESSURE: 124 MMHG | TEMPERATURE: 97.4 F | RESPIRATION RATE: 16 BRPM | DIASTOLIC BLOOD PRESSURE: 80 MMHG | OXYGEN SATURATION: 100 % | BODY MASS INDEX: 31.83 KG/M2 | HEART RATE: 74 BPM

## 2019-03-07 DIAGNOSIS — Z95.1 PRESENCE OF AORTOCORONARY BYPASS GRAFT: ICD-10-CM

## 2019-03-07 DIAGNOSIS — Z09 ENCOUNTER FOR FOLLOW-UP EXAMINATION AFTER COMPLETED TREATMENT FOR CONDITIONS OTHER THAN MALIGNANT NEOPLASM: ICD-10-CM

## 2019-03-07 PROCEDURE — 99024 POSTOP FOLLOW-UP VISIT: CPT

## 2019-03-07 RX ORDER — FENOFIBRATE 160 MG/1
160 TABLET ORAL DAILY
Qty: 30 | Refills: 2 | Status: ACTIVE | COMMUNITY
Start: 1900-01-01 | End: 1900-01-01

## 2019-03-07 RX ORDER — PREGABALIN 150 MG/1
150 CAPSULE ORAL 3 TIMES DAILY
Refills: 0 | Status: ACTIVE | COMMUNITY
Start: 2019-03-07

## 2019-03-07 RX ORDER — NICOTINE 21 MG/24H
21 PATCH, EXTENDED RELEASE TRANSDERMAL DAILY
Refills: 0 | Status: ACTIVE | COMMUNITY
Start: 2019-03-07

## 2019-03-07 RX ORDER — DOCUSATE SODIUM 100 MG/1
100 CAPSULE ORAL 3 TIMES DAILY
Refills: 0 | Status: COMPLETED | COMMUNITY
End: 2019-03-07

## 2019-03-07 RX ORDER — METOPROLOL SUCCINATE 100 MG/1
100 TABLET, EXTENDED RELEASE ORAL DAILY
Qty: 30 | Refills: 3 | Status: ACTIVE | COMMUNITY
Start: 2019-02-28 | End: 1900-01-01

## 2019-03-07 RX ORDER — GABAPENTIN 100 MG/1
100 CAPSULE ORAL 3 TIMES DAILY
Refills: 0 | Status: COMPLETED | COMMUNITY
Start: 2019-02-28 | End: 2019-03-07

## 2019-03-07 RX ORDER — HYDROMORPHONE HYDROCHLORIDE 4 MG/1
4 TABLET ORAL
Refills: 0 | Status: COMPLETED | COMMUNITY
End: 2019-03-07

## 2019-03-07 RX ORDER — DOCUSATE SODIUM 100 MG/1
100 CAPSULE, LIQUID FILLED ORAL 3 TIMES DAILY
Qty: 30 | Refills: 0 | Status: COMPLETED | COMMUNITY
Start: 2019-02-28 | End: 2019-03-07

## 2019-03-07 RX ORDER — PANTOPRAZOLE SODIUM 40 MG/1
40 TABLET, DELAYED RELEASE ORAL DAILY
Refills: 0 | Status: COMPLETED | COMMUNITY
End: 2019-03-07

## 2019-03-25 ENCOUNTER — APPOINTMENT (OUTPATIENT)
Dept: CARDIOTHORACIC SURGERY | Facility: CLINIC | Age: 59
End: 2019-03-25

## 2019-10-11 NOTE — BEHAVIORAL HEALTH ASSESSMENT NOTE - NSBHCHARTREVIEWVS_PSY_A_CORE FT
-- [facility] (Other) [phone]          Alternative phone number: [phone]    Turnaround time given to caller:   \"This message will be sent to [state Provider's name]. The clinical team will fulfill your request as soon as they review your message.\"    
Spoke to Tano from [facility]. They are needing the patients last office visit note. I will fax last office note once signed. Thank you!  
Vital Signs Last 24 Hrs  T(C): 36.7 (08 Feb 2019 12:57), Max: 36.8 (07 Feb 2019 20:16)  T(F): 98.1 (08 Feb 2019 12:57), Max: 98.3 (07 Feb 2019 20:16)  HR: 98 (08 Feb 2019 12:57) (90 - 98)  BP: 113/73 (08 Feb 2019 12:57) (113/73 - 149/92)  BP(mean): --  RR: 17 (08 Feb 2019 12:57) (16 - 18)  SpO2: 96% (08 Feb 2019 12:57) (93% - 96%)

## 2022-04-19 NOTE — ED PROVIDER NOTE - ABNORMAL RHYTHM
sinus tachycardia Topical Clindamycin Counseling: Patient counseled that this medication may cause skin irritation or allergic reactions.  In the event of skin irritation, the patient was advised to reduce the amount of the drug applied or use it less frequently.   The patient verbalized understanding of the proper use and possible adverse effects of clindamycin.  All of the patient's questions and concerns were addressed.

## 2022-10-06 NOTE — PROGRESS NOTE ADULT - PROVIDER SPECIALTY LIST ADULT
CT Surgery
Cardiology
Critical Care
Critical Care
CT Surgery
No

## 2024-10-08 NOTE — ED PROVIDER NOTE - NS ED MD DISPO ISOLATION TYPES
FAMILY HISTORY:  Father  Still living? Yes, Estimated age: Age Unknown  FHx: hypercholesterolemia, Age at diagnosis: Age Unknown    Mother  Still living? Yes, Estimated age: Age Unknown  Family history of diabetes mellitus (DM), Age at diagnosis: Age Unknown  FH: HTN (hypertension), Age at diagnosis: Age Unknown     None

## 2025-01-02 NOTE — ED ADULT NURSE NOTE - NSFALLRSKASSESSTYPE_ED_ALL_ED
The patient's goals for the shift include improved pulmonary hygiene    The clinical goals for the shift include improved pulmonary hygiene         Initial (On Arrival)